# Patient Record
Sex: MALE | Race: OTHER | HISPANIC OR LATINO | URBAN - METROPOLITAN AREA
[De-identification: names, ages, dates, MRNs, and addresses within clinical notes are randomized per-mention and may not be internally consistent; named-entity substitution may affect disease eponyms.]

---

## 2017-09-26 ENCOUNTER — EMERGENCY (EMERGENCY)
Facility: HOSPITAL | Age: 52
LOS: 1 days | Discharge: HOME | End: 2017-09-26
Attending: EMERGENCY MEDICINE
Payer: COMMERCIAL

## 2017-09-26 VITALS
OXYGEN SATURATION: 98 % | RESPIRATION RATE: 18 BRPM | HEIGHT: 65 IN | HEART RATE: 98 BPM | TEMPERATURE: 98 F | SYSTOLIC BLOOD PRESSURE: 169 MMHG | WEIGHT: 175.05 LBS | DIASTOLIC BLOOD PRESSURE: 115 MMHG

## 2017-09-26 PROCEDURE — 99284 EMERGENCY DEPT VISIT MOD MDM: CPT

## 2017-09-26 PROCEDURE — T1013: CPT

## 2017-09-26 PROCEDURE — 99282 EMERGENCY DEPT VISIT SF MDM: CPT

## 2017-09-26 RX ORDER — DIAZEPAM 5 MG
1 TABLET ORAL
Qty: 8 | Refills: 0
Start: 2017-09-26 | End: 2017-09-30

## 2017-09-26 RX ORDER — IBUPROFEN 200 MG
1 TABLET ORAL
Qty: 15 | Refills: 0
Start: 2017-09-26 | End: 2017-10-01

## 2017-09-26 NOTE — ED STATDOCS - OBJECTIVE STATEMENT
52 year old male with son at bedside presenting to the ED complaining of lower back pain. Pt states that his pain onset while performing yard work. He states that he took pain medication this morning. Denies HA, dizziness, numbness, tingling, photophobia, diplopia, change in vision/hearing/gait/mental status/speech, focal weakness, neck pain, rash, fever, chills, stiffness, abdominal pain, hx of DVT/PE, leg swelling, CP, SOB, palpitations, diaphoresis, N/V/D/C, change in urinary/bowel function, dysuria, hematuria, flank pain, malaise, or motor/sensory deficits 52 year old male with son at bedside presenting to the ED complaining of lower back pain. Pt states that his pain onset while performing yard work. He states that he took pain medication this morning. Denies HA, dizziness, numbness, tingling, photophobia, diplopia, change in vision/hearing/gait/mental status/speech, focal weakness, neck pain, rash, fever, chills, stiffness, abdominal pain, hx of DVT/PE, leg swelling, CP, SOB, palpitations, diaphoresis, N/V/D/C, saddle anesthesia. change in urinary/bowel function, dysuria, hematuria, flank pain, malaise, or motor/sensory deficits

## 2017-09-26 NOTE — ED STATDOCS - CHPI ED SYMPTOM NEG
NO BOWEL OR URINARY INCONTINENCE/no fever no fever/NO BOWEL OR URINARY INCONTINENCE, NO SADDLE ANESTHESIA

## 2020-09-24 ENCOUNTER — APPOINTMENT (OUTPATIENT)
Dept: OTOLARYNGOLOGY | Facility: CLINIC | Age: 55
End: 2020-09-24
Payer: COMMERCIAL

## 2020-09-24 VITALS
WEIGHT: 145 LBS | DIASTOLIC BLOOD PRESSURE: 99 MMHG | HEIGHT: 65 IN | TEMPERATURE: 99 F | BODY MASS INDEX: 24.16 KG/M2 | HEART RATE: 77 BPM | SYSTOLIC BLOOD PRESSURE: 152 MMHG

## 2020-09-24 DIAGNOSIS — R22.1 LOCALIZED SWELLING, MASS AND LUMP, NECK: ICD-10-CM

## 2020-09-24 DIAGNOSIS — Z86.79 PERSONAL HISTORY OF OTHER DISEASES OF THE CIRCULATORY SYSTEM: ICD-10-CM

## 2020-09-24 PROCEDURE — 99204 OFFICE O/P NEW MOD 45 MIN: CPT | Mod: 25

## 2020-09-24 PROCEDURE — 31575 DIAGNOSTIC LARYNGOSCOPY: CPT

## 2020-09-24 NOTE — CONSULT LETTER
[Consult Letter:] : I had the pleasure of evaluating your patient, [unfilled]. [Please see my note below.] : Please see my note below. [Consult Closing:] : Thank you very much for allowing me to participate in the care of this patient.  If you have any questions, please do not hesitate to contact me. [Sincerely,] : Sincerely, [FreeTextEntry2] : Tayler Bernard MD [FreeTextEntry1] : Dear Dr.  none,\par \par Thank you for your kind referral. Please refer to my enclosed office notes for MARANDA MONTILLA . If there are any questions free to contact me.\par  [FreeTextEntry3] : ,sig\par

## 2020-09-24 NOTE — PROCEDURE
[de-identified] : Indication for procedure:Unable to examine laryngeal structures with mirror exam\par Scope # \par Topical anesthesia with viscous xylocaine 2% is applied to the anterior nares.\par A flexible fiberoptic laryngoscope is than introduced through the nares.\par The nasopharynx is clear without mass or inflammation.\par The posterior pharyngeal wall is unremarkable.\par The tongue base and vallecula are unremarkable.  The hypopharynx is unremarkable and unobstructed.\par The supraglottic larynx is within normal limits.\par Both vocal cords are fully mobile with no nodule, polyp or other lesion present.\par There is no edema or erythema overlying the arytenoid cartilages or the inter-arytenoid space.\par The subglottic space is clear.\par The voice has a normal quality.\par

## 2020-09-24 NOTE — HISTORY OF PRESENT ILLNESS
[de-identified] : pt w sin for translation\par pt co sore throat  intermittent x 3 mo slowly getting worse\par points to upper midline neck\par associated rt ear pain no tobacco, no etoh

## 2020-09-24 NOTE — ASSESSMENT
[FreeTextEntry1] : large mass rt tonsil, firm to  palpation\par neg palpable neck nodes\par suspicious rt tonsil mass\par ct neck\par fu for biopsy asap

## 2020-09-24 NOTE — REASON FOR VISIT
[Initial Consultation] : an initial consultation for [Ear Pain] : ear pain [FreeTextEntry2] : DYSPHAGIA AND EAR PAIN

## 2020-10-07 ENCOUNTER — OUTPATIENT (OUTPATIENT)
Dept: OUTPATIENT SERVICES | Facility: HOSPITAL | Age: 55
LOS: 1 days | End: 2020-10-07
Payer: COMMERCIAL

## 2020-10-07 ENCOUNTER — RESULT REVIEW (OUTPATIENT)
Age: 55
End: 2020-10-07

## 2020-10-07 ENCOUNTER — APPOINTMENT (OUTPATIENT)
Dept: CT IMAGING | Facility: CLINIC | Age: 55
End: 2020-10-07
Payer: COMMERCIAL

## 2020-10-07 DIAGNOSIS — J35.9 CHRONIC DISEASE OF TONSILS AND ADENOIDS, UNSPECIFIED: ICD-10-CM

## 2020-10-07 DIAGNOSIS — Z00.00 ENCOUNTER FOR GENERAL ADULT MEDICAL EXAMINATION WITHOUT ABNORMAL FINDINGS: ICD-10-CM

## 2020-10-07 PROCEDURE — 70491 CT SOFT TISSUE NECK W/DYE: CPT | Mod: 26

## 2020-10-07 PROCEDURE — 70491 CT SOFT TISSUE NECK W/DYE: CPT

## 2020-10-12 ENCOUNTER — APPOINTMENT (OUTPATIENT)
Dept: OTOLARYNGOLOGY | Facility: CLINIC | Age: 55
End: 2020-10-12
Payer: COMMERCIAL

## 2020-10-12 ENCOUNTER — APPOINTMENT (OUTPATIENT)
Dept: OTOLARYNGOLOGY | Facility: CLINIC | Age: 55
End: 2020-10-12

## 2020-10-12 VITALS
DIASTOLIC BLOOD PRESSURE: 101 MMHG | SYSTOLIC BLOOD PRESSURE: 176 MMHG | HEIGHT: 65 IN | WEIGHT: 145 LBS | TEMPERATURE: 98 F | BODY MASS INDEX: 24.16 KG/M2 | HEART RATE: 75 BPM

## 2020-10-12 VITALS — HEART RATE: 61 BPM | SYSTOLIC BLOOD PRESSURE: 146 MMHG | DIASTOLIC BLOOD PRESSURE: 95 MMHG

## 2020-10-12 PROCEDURE — 99213 OFFICE O/P EST LOW 20 MIN: CPT | Mod: 25

## 2020-10-12 PROCEDURE — 99214 OFFICE O/P EST MOD 30 MIN: CPT | Mod: 25

## 2020-10-12 PROCEDURE — 42800 BIOPSY OF THROAT: CPT | Mod: RT

## 2020-10-12 PROCEDURE — 31575 DIAGNOSTIC LARYNGOSCOPY: CPT

## 2020-10-12 NOTE — REASON FOR VISIT
[Initial Evaluation] : an initial evaluation for [[employer] ] : provided by [employer]   [FreeTextEntry1] : [id number] [FreeTextEntry2] : claudette [TWNoteComboBox1] : F[place]

## 2020-10-12 NOTE — REASON FOR VISIT
[Subsequent Evaluation] : a subsequent evaluation for [[employer] ] : provided by [employer]   [FreeTextEntry1] : [id number] [FreeTextEntry2] : Howard [TWNoteComboBox1] : Filipino

## 2020-10-12 NOTE — PHYSICAL EXAM
[Midline] : trachea located in midline position [Normal] : no rashes [de-identified] : large mass firm rt tonsil extendng to rt tongue base

## 2020-10-12 NOTE — ASSESSMENT
[FreeTextEntry1] : ct reviewed w pt\par lesion rt tonsil suspicious for malignant neoplasm\par fu [facility]\par biopsy taken rt mid tonsil

## 2020-10-12 NOTE — PROCEDURE
[FreeTextEntry2] : rt tonsil mass [FreeTextEntry3] : xylo w epi 2 cc biopsy taken rt tonsil\par ag nitrate cautery\par welltolerated

## 2020-10-12 NOTE — HISTORY OF PRESENT ILLNESS
[de-identified] : pt is refer by Dr. Navarrete for right tonsil mass. biopsy of tonsil done at office today. pt has ct of neck showed  enhancing mass centered in the right palatine tonsil with invasion of the right medial pterygoid muscle and possible invasion of the right base of tongue. pt c.o sore throat for 2-3 months on and off radiate to right neck and right ear, pt has some dysphagia on solid food. pt is eating soft diet about 1 month. wt loss about -10lbs. no blood from the mouth or coughing up blood.  never smoke , social etoh.

## 2020-10-12 NOTE — PHYSICAL EXAM
[Laryngoscopy Performed] : laryngoscopy was performed, see procedure section for findings [FreeTextEntry1] : Significant trismus with interincisor opening of approx. 1.5 cm.  Large R. tonsil mass involving the entire right timi soft palate and essentially completely obstructing the R. half of the OPx with involvement of the BOT.   [Normal] : no rashes

## 2020-10-12 NOTE — PROCEDURE
[Lesion] : lesion identified by mirror examination needing further evaluation [None] : none [Flexible Endoscope] : examined with the flexible endoscope [Serial Number: ___] : Serial Number: [unfilled] [de-identified] : Patient with right tonsil mass involving the soft palate, the right lateral pharyngeal wall, BOT.  This completely obstructs the OPx on the right but the left side is patent.  There doesn't appear to be any involvement of the epiglottis and the larynx.   VC are mobile, airway patent.\par

## 2020-10-19 ENCOUNTER — RESULT REVIEW (OUTPATIENT)
Age: 55
End: 2020-10-19

## 2020-10-21 ENCOUNTER — APPOINTMENT (OUTPATIENT)
Dept: NUCLEAR MEDICINE | Facility: CLINIC | Age: 55
End: 2020-10-21
Payer: COMMERCIAL

## 2020-10-21 ENCOUNTER — OUTPATIENT (OUTPATIENT)
Dept: OUTPATIENT SERVICES | Facility: HOSPITAL | Age: 55
LOS: 1 days | End: 2020-10-21

## 2020-10-21 DIAGNOSIS — C09.9 MALIGNANT NEOPLASM OF TONSIL, UNSPECIFIED: ICD-10-CM

## 2020-10-21 PROCEDURE — 78815 PET IMAGE W/CT SKULL-THIGH: CPT | Mod: 26,PI

## 2020-10-26 ENCOUNTER — APPOINTMENT (OUTPATIENT)
Dept: OTOLARYNGOLOGY | Facility: CLINIC | Age: 55
End: 2020-10-26
Payer: COMMERCIAL

## 2020-10-26 VITALS — SYSTOLIC BLOOD PRESSURE: 152 MMHG | HEART RATE: 76 BPM | DIASTOLIC BLOOD PRESSURE: 100 MMHG

## 2020-10-26 PROCEDURE — 99072 ADDL SUPL MATRL&STAF TM PHE: CPT

## 2020-10-26 PROCEDURE — 31575 DIAGNOSTIC LARYNGOSCOPY: CPT

## 2020-10-26 PROCEDURE — 99215 OFFICE O/P EST HI 40 MIN: CPT | Mod: 25

## 2020-10-26 NOTE — HISTORY OF PRESENT ILLNESS
[de-identified] : pt is refer by Dr. 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 pt c.o sore throat for 2-3 months on and off radiate to right neck and right ear, pt has some dysphagia on solid food. pt is eating soft diet about 1 month. wt loss about -10lbs. no blood from the mouth or coughing up blood.  never smoke , social etoh. pt is here today to pet ct result and further treatment plan. pt will see Dr. Strong tomorrow and Dr. Cain on 10/30/2020. pt states pain worsen at night and not eat well.

## 2020-10-26 NOTE — PHYSICAL EXAM
[Laryngoscopy Performed] : laryngoscopy was performed, see procedure section for findings [Normal] : no rashes [FreeTextEntry1] : Significant trismus with interincisor opening of approx. 1.5 cm.  Large R. tonsil mass involving the entire right timi soft palate and essentially completely obstructing the R. half of the OPx with involvement of the BOT.

## 2020-10-26 NOTE — REASON FOR VISIT
[[employer] ] : provided by [employer]   [Subsequent Evaluation] : a subsequent evaluation for [FreeTextEntry1] : [id number] [FreeTextEntry2] : layne [TWNoteComboBox1] : Argentine

## 2020-10-26 NOTE — PROCEDURE
[Lesion] : lesion identified by mirror examination needing further evaluation [None] : none [Flexible Endoscope] : examined with the flexible endoscope [Serial Number: ___] : Serial Number: [unfilled] [de-identified] : Patient with right tonsil mass involving the soft palate, the right lateral pharyngeal wall, BOT. This completely obstructs the OPx on the right but the left side is patent. There doesn't appear to be any involvement of the epiglottis and the larynx. VC are mobile, airway patent.

## 2020-10-27 ENCOUNTER — OUTPATIENT (OUTPATIENT)
Dept: OUTPATIENT SERVICES | Facility: HOSPITAL | Age: 55
LOS: 1 days | Discharge: HOME | End: 2020-10-27
Payer: COMMERCIAL

## 2020-10-27 ENCOUNTER — APPOINTMENT (OUTPATIENT)
Dept: RADIATION ONCOLOGY | Facility: CLINIC | Age: 55
End: 2020-10-27
Payer: COMMERCIAL

## 2020-10-27 VITALS
SYSTOLIC BLOOD PRESSURE: 147 MMHG | TEMPERATURE: 98 F | BODY MASS INDEX: 26.82 KG/M2 | WEIGHT: 161 LBS | DIASTOLIC BLOOD PRESSURE: 103 MMHG | HEART RATE: 68 BPM | HEIGHT: 65 IN | OXYGEN SATURATION: 98 %

## 2020-10-27 DIAGNOSIS — Z80.42 FAMILY HISTORY OF MALIGNANT NEOPLASM OF PROSTATE: ICD-10-CM

## 2020-10-27 DIAGNOSIS — D10.4 BENIGN NEOPLASM OF TONSIL: ICD-10-CM

## 2020-10-27 PROCEDURE — 99072 ADDL SUPL MATRL&STAF TM PHE: CPT

## 2020-10-27 PROCEDURE — 99205 OFFICE O/P NEW HI 60 MIN: CPT

## 2020-10-27 PROCEDURE — 77263 THER RADIOLOGY TX PLNG CPLX: CPT

## 2020-10-27 NOTE — DATA REVIEWED
[FreeTextEntry1] : 10/7/20 CT Neck Soft Tissue w/ IV Cont \par \par COMPARISON: None available.\par \par FINDINGS:\par There is a large heterogeneously enhancing mass centered in the right palatine tonsil with invasion\par of the right medial pterygoid muscle and mass effect on and concern for invasion of right base of\par tongue. This mass measures approximately 4.2 x 4.5 x 4.6 cm (ap x trv x cc). There is associated\par effacement of the right parapharyngeal fat, right vallecula, and right piriform sinus. The\par oropharyngeal airway is mildly narrowed and deviated to the left.\par \par There are mildly prominent although subcentimeter right level II lymph nodes measuring up to 0.8 cm\par in short axis diameter on 3:141 and level IIa and 0.6 mm in short axis diameter level on\par 3:143 in level IIb.\par \par The thyroid gland is enlarged and heterogeneous enhancing with some left lobe calcifications, likely\par representing goiter.\par \par The submandibular and parotid glands are unremarkable.\par \par There is mild mucosal thickening in the anterior ethmoid air cells and right maxillary sinus. The\par mastoid air cells and bilateral middle ear cavities are clear.\par \par There are degenerative changes in the visualized spine.\par \par The visualized lung apices are unremarkable.\par \par IMPRESSION:\par 4.2 x 4.5 x 4.6 cm heterogeneously enhancing mass centered in the right palatine tonsil with\par invasion of the right medial pterygoid muscle and possible invasion of the right base of tongue, most\par concerning for primary malignancy (squamous cell carcinoma). Recommend tissue sampling.\par \par No pathologic cervical lymphadenopathy by size criteria.\par \par \par PET/CT 10/21/2020\par IMPRESSION:\par \par 1.  FDG avid RIGHT tonsillar mass, consistent with known malignancy.\par \par 2.  FDG avid focus posterior RIGHT nasal cavity and posterior nasopharynx, indeterminate. Suggest correlation with direct inspection.\par \par 3.  FDG avid RIGHT level 2 cervical lymph nodes, suspicious for metastatic disease.\par \par 4.  Mildly FDG avid LEFT level IIa lymph node, indeterminate. Consider further evaluation with ultrasound and tissue sampling as indicated.\par \par 5.  FDG avid focus right prostate, indeterminate. Suggest correlation with PSA and urologic evaluation.\par \par 6.  Goiter with diffusely increased FDG avidity, possibly thyroiditis. Suggest correlation with thyroid function tests.\par \par

## 2020-10-27 NOTE — LETTER GREETING
[Dear Doctor] : Dear Doctor, [Consult Letter:] : Your patient, [unfilled] was seen in my office today for consultation. [Please see my note below.] : Please see my note below. [FreeTextEntry2] : Juan Baez MD

## 2020-10-27 NOTE — DISEASE MANAGEMENT
[Clinical] : TNM Stage: c [III] : III [FreeTextEntry4] : squamous cell carcinoma [TTNM] : 4 [NTNM] : 2 [MTNM] : 0 [de-identified] : 7000cGy [de-identified] : with dose painting

## 2020-10-27 NOTE — REASON FOR VISIT
[Head and Neck Cancer] : head and neck cancer [Consideration of Curative Therapy] : consideration of curative therapy for [Family Member] : family member [FreeTextEntry2] : Jatin [TWNoteComboBox1] : [place]

## 2020-10-27 NOTE — VITALS
[Maximal Pain Intensity: 5/10] : 5/10 [Least Pain Intensity: 1/10] : 1/10 [Pain Description/Quality: ___] : Pain description/quality: [unfilled] [Pain Duration: ___] : Pain duration: [unfilled] [Pain Location: ___] : Pain Location: [unfilled] [Pain Interferes with ADLs] : Pain interferes with activities of daily living. [Opioid] : opioid [80: Normal activity with effort; some signs or symptoms of disease.] : 80: Normal activity with effort; some signs or symptoms of disease.  [ECOG Performance Status: 1 - Restricted in physically strenuous activity but ambulatory and able to carry out work of a light or sedentary nature] : Performance Status: 1 - Restricted in physically strenuous activity but ambulatory and able to carry out work of a light or sedentary nature, e.g., light house work, office work

## 2020-10-27 NOTE — LETTER CLOSING
[Consult Closing:] : Thank you for allowing me to participate in the care of this patient.  If you have any questions, please do not hesitate to contact me. [Sincerely yours,] : Sincerely yours, [FreeTextEntry3] : Lonnie Strong MD\par Physician in Chief\par Department of Radiation Medicine\par [facility] [place]\par [facility] [place]\par \par  of Radiation Medicine\par [facility]\par at  [facility]\par \par Radiation \par [facility]/\par [facility] [place]\par [address]\par [place], [place] [address]\par \par Tel: [phone]\par Fax: [phone]\par

## 2020-10-27 NOTE — HISTORY OF PRESENT ILLNESS
[FreeTextEntry1] : This 55 year-old [place] speaking man presents for radiation medicine consultation.  He was referred to Dr. Baez by Dr. Navarrete for a 3-month history of sore throat increasing in intensity, dysphagia, and pain radiating to the right ear.  A right tonsil mass was noted.  CT of neck showed enhancing mass centered in the right palatine tonsil with invasion of the right medial pterygoid muscle and possible invasion of the right base of tongue. Biopsy was done by Dr. Baez in his office on 10/12/20.  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 PET /CT suggest cervical lymph node metastases.\par

## 2020-10-27 NOTE — REVIEW OF SYSTEMS
[Recent Change In Weight] : ~T recent weight change [Dysphagia] : dysphagia [Odynophagia] : odynophagia [Negative] : Allergic/Immunologic [Hoarseness] : no hoarseness [FreeTextEntry2] : estimates approximately 10 lb wt loss. [FreeTextEntry4] : trismus; some jasmin-ear pain on right side. [FreeTextEntry5] : hypertention

## 2020-10-30 ENCOUNTER — RESULT REVIEW (OUTPATIENT)
Age: 55
End: 2020-10-30

## 2020-10-30 ENCOUNTER — APPOINTMENT (OUTPATIENT)
Dept: HEMATOLOGY ONCOLOGY | Facility: CLINIC | Age: 55
End: 2020-10-30
Payer: COMMERCIAL

## 2020-10-30 ENCOUNTER — APPOINTMENT (OUTPATIENT)
Dept: GASTROENTEROLOGY | Facility: CLINIC | Age: 55
End: 2020-10-30
Payer: COMMERCIAL

## 2020-10-30 VITALS
WEIGHT: 158 LBS | HEIGHT: 66 IN | BODY MASS INDEX: 25.39 KG/M2 | RESPIRATION RATE: 15 BRPM | TEMPERATURE: 97.7 F | HEART RATE: 62 BPM | SYSTOLIC BLOOD PRESSURE: 148 MMHG | DIASTOLIC BLOOD PRESSURE: 102 MMHG | OXYGEN SATURATION: 98 %

## 2020-10-30 VITALS
BODY MASS INDEX: 25.95 KG/M2 | DIASTOLIC BLOOD PRESSURE: 99 MMHG | OXYGEN SATURATION: 97 % | HEART RATE: 69 BPM | SYSTOLIC BLOOD PRESSURE: 144 MMHG | WEIGHT: 161.44 LBS | TEMPERATURE: 97.3 F | HEIGHT: 66 IN

## 2020-10-30 DIAGNOSIS — Z80.0 FAMILY HISTORY OF MALIGNANT NEOPLASM OF DIGESTIVE ORGANS: ICD-10-CM

## 2020-10-30 LAB
BASOPHILS # BLD AUTO: 0.1 K/UL — SIGNIFICANT CHANGE UP (ref 0–0.2)
BASOPHILS NFR BLD AUTO: 0.7 % — SIGNIFICANT CHANGE UP (ref 0–2)
EOSINOPHIL # BLD AUTO: 0.2 K/UL — SIGNIFICANT CHANGE UP (ref 0–0.5)
EOSINOPHIL NFR BLD AUTO: 1.3 % — SIGNIFICANT CHANGE UP (ref 0–6)
HCT VFR BLD CALC: 48.2 % — SIGNIFICANT CHANGE UP (ref 39–50)
HGB BLD-MCNC: 15.6 G/DL — SIGNIFICANT CHANGE UP (ref 13–17)
LYMPHOCYTES # BLD AUTO: 18.5 % — SIGNIFICANT CHANGE UP (ref 13–44)
LYMPHOCYTES # BLD AUTO: 2.2 K/UL — SIGNIFICANT CHANGE UP (ref 1–3.3)
MCHC RBC-ENTMCNC: 30.1 PG — SIGNIFICANT CHANGE UP (ref 27–34)
MCHC RBC-ENTMCNC: 32.2 G/DL — SIGNIFICANT CHANGE UP (ref 32–36)
MCV RBC AUTO: 93.5 FL — SIGNIFICANT CHANGE UP (ref 80–100)
MONOCYTES # BLD AUTO: 0.8 K/UL — SIGNIFICANT CHANGE UP (ref 0–0.9)
MONOCYTES NFR BLD AUTO: 7.1 % — SIGNIFICANT CHANGE UP (ref 2–14)
NEUTROPHILS # BLD AUTO: 8.5 K/UL — HIGH (ref 1.8–7.4)
NEUTROPHILS NFR BLD AUTO: 72.4 % — SIGNIFICANT CHANGE UP (ref 43–77)
PLATELET # BLD AUTO: 265 K/UL — SIGNIFICANT CHANGE UP (ref 150–400)
RBC # BLD: 5.16 M/UL — SIGNIFICANT CHANGE UP (ref 4.2–5.8)
RBC # FLD: 11.9 % — SIGNIFICANT CHANGE UP (ref 10.3–14.5)
WBC # BLD: 11.8 K/UL — HIGH (ref 3.8–10.5)
WBC # FLD AUTO: 11.8 K/UL — HIGH (ref 3.8–10.5)

## 2020-10-30 PROCEDURE — 99205 OFFICE O/P NEW HI 60 MIN: CPT

## 2020-10-30 PROCEDURE — 93010 ELECTROCARDIOGRAM REPORT: CPT

## 2020-10-30 PROCEDURE — 99072 ADDL SUPL MATRL&STAF TM PHE: CPT

## 2020-10-30 PROCEDURE — 99203 OFFICE O/P NEW LOW 30 MIN: CPT

## 2020-10-30 NOTE — PHYSICAL EXAM
[General Appearance - Alert] : alert [General Appearance - In No Acute Distress] : in no acute distress [General Appearance - Well Nourished] : well nourished [General Appearance - Well Developed] : well developed [General Appearance - Well-Appearing] : healthy appearing [Sclera] : the sclera and conjunctiva were normal [PERRL With Normal Accommodation] : pupils were equal in size, round, and reactive to light [Extraocular Movements] : extraocular movements were intact [Outer Ear] : the ears and nose were normal in appearance [Hearing Threshold Finger Rub Not [place]] : hearing was normal [Examination Of The Oral Cavity] : the lips and gums were normal [Neck Appearance] : the appearance of the neck was normal [Auscultation Breath Sounds / Voice Sounds] : lungs were clear to auscultation bilaterally [Heart Rate And Rhythm] : heart rate was normal and rhythm regular [Heart Sounds] : normal S1 and S2 [Heart Sounds Gallop] : no gallops [Murmurs] : no murmurs [Heart Sounds Pericardial Friction Rub] : no pericardial rub [Bowel Sounds] : normal bowel sounds [Abdomen Soft] : soft [Abdomen Tenderness] : non-tender [Abdomen Mass (___ Cm)] : no abdominal mass palpated [No CVA Tenderness] : no ~M costovertebral angle tenderness [No Spinal Tenderness] : no spinal tenderness [Abnormal Walk] : normal gait [Nail Clubbing] : no clubbing  or cyanosis of the fingernails [Musculoskeletal - Swelling] : no joint swelling seen [Motor Tone] : muscle strength and tone were normal [Skin Color & Pigmentation] : normal skin color and pigmentation [Skin Turgor] : normal skin turgor [] : no rash [Motor Exam] : the motor exam was normal [No Focal Deficits] : no focal deficits [Oriented To Time, Place, And Person] : oriented to person, place, and time [Impaired Insight] : insight and judgment were intact [Affect] : the affect was normal

## 2020-10-30 NOTE — HISTORY OF PRESENT ILLNESS
[T: ___] : T[unfilled] [N: ___] : N[unfilled] [M: ___] : M[unfilled] [AJCC Stage: ____] : AJCC Stage: [unfilled] [de-identified] : The patient was diagnosed with SCCA  of the right tonsil in October 2020 at the age of 55.  He presented to ENT, Dr. Freddie Navarrete, c/o worsening sore throat, dysphagia and right ear pain.  Physical exam showed a firm mass encompassing the right tonsil.  CT neck showed a large heterogeneously enhancing mass centered in the right palatine tonsil with invasion of the right medial pterygoid muscle and mass effect on and concern for invasion of right base of tongue. This mass measures approximately 4.2 x 4.5 x 4.6 cm. There is associated effacement of the right parapharyngeal fat, right vallecula, and right piriform sinus. The oropharyngeal airway is mildly narrowed and deviated to the left.  There are mildly prominent although subcentimeter right level II lymph nodes measuring up to 0.8 cm in short axis diameter and level IIa and 0.6 mm in short axis diameter level in level IIb. Biopsy with Dr. Navarrete c/w squamous cell carcinoma, non-keratinizing, consistent with HPV-associated squamous cell carcinoma. The outside immunostains show p16, pancytokerain AE1/AE3, Cam5.2, and p63 are positive. \par \par He next saw Dr. Juan Baez where a laryngoscopy showed a right tonsil mass involving the soft palate, the right lateral pharyngeal wall, BOT. This completely obstructs the OPx on the right but the left side is patent. There doesn't appear to be any involvement of the epiglottis and the larynx. VC are mobile, airway patent.  Staging PET showed an FDG avid mass centered in the right palatine tonsil with invasion of the right medial pterygoid muscle and probable direct invasion of the right base of tongue (SUV 16.1). Mass approaches but does not extend across midline. Effacement of the right parapharyngeal fat, right vallecula, and right piriform sinus. Deviation of the oropharyngeal airway towards the left with mild associated narrowing, unchanged since October 7, 2020.  FDG avid focus right posterior nasal cavity (SUV 6.5) and in the region of the torus tubarius (SUV 4.9).  FDG avid right level 2 cervical lymph nodes:  Right level IIa, 1.0 cm, (SUV 3.4).  Right level IIb, 0.7 cm (SUV 3.2).  FDG avid left level IIa lymph node 1.0 cm (SUV 3.2). Diffuse enlargement of the thyroid gland with uniform FDG avidity (SUV 6.8).  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  [de-identified] : Pt presents today with no specific complaints today aside from odynophagia x ~ 3-4 months.  He reports intermittent dysphagia with solids only.   He has no hoarseness or trismus; some jasmin-auricular pain on right side. Pt is eating soft diet about 1 month. wt loss about -10lbs. no blood from the mouth or coughing up blood. See detailed ROS below.

## 2020-10-30 NOTE — RESULTS/DATA
[FreeTextEntry1] : 10/21/20 PET:\par FDG avid mass centered in the right palatine tonsil with invasion of the right medial pterygoid muscle and probable direct invasion of the right base of tongue (SUV 16.1). Mass approaches but does not extend across midline. Effacement of the right parapharyngeal fat, right vallecula, and right piriform sinus. Deviation of the oropharyngeal airway towards the left with mild associated narrowing, unchanged since October 7, 2020.  FDG avid focus right posterior nasal cavity (SUV 6.5) and in the region of the torus tubarius (SUV 4.9).  FDG avid right level 2 cervical lymph nodes:  Right level IIa, 1.0 cm, (SUV 3.4).  Right level IIb, 0.7 cm (SUV 3.2).  FDG avid left level IIa lymph node 1.0 cm (SUV 3.2). Diffuse enlargement of the thyroid gland with uniform FDG avidity (SUV 6.8).\par \par 10/12/20 Pathology:\par Tonsil, right, biopsy:  Squamous cell carcinoma, non-keratinizing, consistent with HPV-associated squamous cell carcinoma. The outside immunostains show p16, pancytokerain AE1/AE3, Cam5.2, and p63 are positive.  \par \par 10/7/20 CT Neck:\par large heterogeneously enhancing mass centered in the right palatine tonsil with invasion of the right medial pterygoid muscle and mass effect on and concern for invasion of right base of tongue. This mass measures approximately 4.2 x 4.5 x 4.6 cm (ap x trv x cc). There is associated effacement of the right parapharyngeal fat, right vallecula, and right piriform sinus. The oropharyngeal airway is mildly narrowed and deviated to the left.  There are mildly prominent although subcentimeter right level II lymph nodes measuring up to 0.8 cm in short axis diameter on 3:141 and level IIa and 0.6 mm in short axis diameter level on 3:143 in level IIb.

## 2020-10-30 NOTE — ASSESSMENT
[FreeTextEntry1] : The patient is agreeable to undergoing a percutaneous endoscopic gastrostomy tube placement which will be scheduled at [facility]. The indications as well as the procedure itself and the risks associated with the procedure were discussed with the patient.

## 2020-10-30 NOTE — PHYSICAL EXAM
[Restricted in physically strenuous activity but ambulatory and able to carry out work of a light or sedentary nature] : Status 1- Restricted in physically strenuous activity but ambulatory and able to carry out work of a light or sedentary nature, e.g., light house work, office work [Normal] : affect appropriate [de-identified] :  Significant trismus with interincisor opening of approx. 1.5 cm. Large R. tonsil mass involving the entire right timi soft palate and essentially completely obstructing the R. half of the OPx with involvement of the BOT. \par Significant trismus with interincisor opening of approx. 1.5 cm. Large R. tonsil mass involving the entire right timi soft palate and essentially completely obstructing the R. half of the OPx with involvement of the BOT

## 2020-10-30 NOTE — REASON FOR VISIT
[Consultation] : a consultation visit [FreeTextEntry1] : squamous cell tonsillar cancer and dysphagia

## 2020-10-30 NOTE — HISTORY OF PRESENT ILLNESS
[de-identified] : 3 months ago the patient noted the presence of enlarging mass on the right side of his neck and difficulty swallowing solids which has not significantly worsened over this period of time. He was eventually evaluated and was found to have a squamous cell carcinoma of his right tonsil as locally invasive. He is about to undergo both radiation and chemotherapy. He has had a 10 pound weight loss which more recently has stabilized. He can manage liquids without difficulty and most solids but has to eat slowly. It is requested that he undergo a gastrostomy tube placement in anticipation of an exacerbation of the dysphagia as a result of the chemotherapy and radiation therapy. He denies any abdominal pain, nausea or vomiting. There is no easy bruising or excessive bleeding. His appetite is good. There is no diarrhea, constipation, rectal bleeding or melena.

## 2020-10-30 NOTE — CONSULT LETTER
[Dear  ___] : Dear  [unfilled], [Consult Letter:] : I had the pleasure of evaluating your patient, [unfilled]. [Please see my note below.] : Please see my note below. [Consult Closing:] : Thank you very much for allowing me to participate in the care of this patient.  If you have any questions, please do not hesitate to contact me. [Sincerely,] : Sincerely, [DrHunter  ___] : Dr. ROGERS [FreeTextEntry3] : Dr. Kaia Cain

## 2020-11-02 ENCOUNTER — NON-APPOINTMENT (OUTPATIENT)
Age: 55
End: 2020-11-02

## 2020-11-02 LAB
ALBUMIN SERPL ELPH-MCNC: 4.5 G/DL
ALP BLD-CCNC: 63 U/L
ALT SERPL-CCNC: 14 U/L
ANION GAP SERPL CALC-SCNC: 12 MMOL/L
AST SERPL-CCNC: 22 U/L
BILIRUB SERPL-MCNC: 0.8 MG/DL
BUN SERPL-MCNC: 8 MG/DL
CALCIUM SERPL-MCNC: 9.8 MG/DL
CHLORIDE SERPL-SCNC: 95 MMOL/L
CO2 SERPL-SCNC: 30 MMOL/L
CREAT SERPL-MCNC: 0.86 MG/DL
GLUCOSE SERPL-MCNC: 134 MG/DL
HBV CORE IGG+IGM SER QL: REACTIVE
HBV SURFACE AB SER QL: REACTIVE
HBV SURFACE AG SER QL: NONREACTIVE
HCV AB SER QL: NONREACTIVE
HCV S/CO RATIO: 0.21 S/CO
INR PPP: 1.11 RATIO
MAGNESIUM SERPL-MCNC: 2 MG/DL
POTASSIUM SERPL-SCNC: 4.3 MMOL/L
PROT SERPL-MCNC: 7.9 G/DL
PT BLD: 13 SEC
SODIUM SERPL-SCNC: 137 MMOL/L

## 2020-11-02 PROCEDURE — 77470 SPECIAL RADIATION TREATMENT: CPT | Mod: 26

## 2020-11-03 ENCOUNTER — APPOINTMENT (OUTPATIENT)
Dept: OTOLARYNGOLOGY | Facility: CLINIC | Age: 55
End: 2020-11-03
Payer: COMMERCIAL

## 2020-11-03 ENCOUNTER — APPOINTMENT (OUTPATIENT)
Dept: INTERVENTIONAL RADIOLOGY/VASCULAR | Facility: CLINIC | Age: 55
End: 2020-11-03

## 2020-11-03 VITALS — BODY MASS INDEX: 25.39 KG/M2 | WEIGHT: 158 LBS | HEIGHT: 66 IN | TEMPERATURE: 97.6 F

## 2020-11-03 DIAGNOSIS — R07.0 PAIN IN THROAT: ICD-10-CM

## 2020-11-03 DIAGNOSIS — J35.8 OTHER CHRONIC DISEASES OF TONSILS AND ADENOIDS: ICD-10-CM

## 2020-11-03 PROCEDURE — 99214 OFFICE O/P EST MOD 30 MIN: CPT

## 2020-11-03 PROCEDURE — 99072 ADDL SUPL MATRL&STAF TM PHE: CPT

## 2020-11-03 NOTE — HISTORY OF PRESENT ILLNESS
[de-identified] : Followed by Dr. Baez - hx cancer in right tonsil.  Saw Dr. Baez October 26,2020 - about one week ago.  Here today due to right ear pain.  Here to r/o infection.  Patient sent by oncologist to r/o ear infection.

## 2020-11-03 NOTE — ASSESSMENT
[FreeTextEntry1] : Patient here with right otalgia.  No evidence of ear infection.  Patient with large right tonsil cancer. Feel pain and other issues are from tonsil cancer.  Pain is referred from throat to ear.  Recommended patient return to oncology and have treatment as recommended by oncology and Dr. Baez.

## 2020-11-03 NOTE — PHYSICAL EXAM
[de-identified] : 2 + left tonsil - LARGE RIGHT TONSIL MASS EXTENDING TO BOT  - PATIENT NOTED TO HAVE TRISMUS ON ATTEMPTING TO OPEN MOUTH.   [Normal] : no rashes

## 2020-11-03 NOTE — REASON FOR VISIT
[Subsequent Evaluation] : a subsequent evaluation for [[employer] ] : provided by [employer]   [FreeTextEntry1] : [id number] [FreeTextEntry2] : Nayeli [TWNoteComboBox1] : Hungarian

## 2020-11-09 ENCOUNTER — APPOINTMENT (OUTPATIENT)
Dept: UROLOGY | Facility: CLINIC | Age: 55
End: 2020-11-09
Payer: COMMERCIAL

## 2020-11-09 VITALS
RESPIRATION RATE: 16 BRPM | SYSTOLIC BLOOD PRESSURE: 149 MMHG | WEIGHT: 157 LBS | DIASTOLIC BLOOD PRESSURE: 103 MMHG | TEMPERATURE: 98.1 F | HEIGHT: 66 IN | BODY MASS INDEX: 25.23 KG/M2 | HEART RATE: 73 BPM

## 2020-11-09 DIAGNOSIS — Z80.3 FAMILY HISTORY OF MALIGNANT NEOPLASM OF BREAST: ICD-10-CM

## 2020-11-09 LAB
BILIRUB UR QL STRIP: NORMAL
CLARITY UR: CLEAR
COLLECTION METHOD: NORMAL
GLUCOSE UR-MCNC: NORMAL
HCG UR QL: 2 EU/DL
HGB UR QL STRIP.AUTO: NORMAL
KETONES UR-MCNC: NORMAL
LEUKOCYTE ESTERASE UR QL STRIP: NORMAL
NITRITE UR QL STRIP: NORMAL
PH UR STRIP: 7
PROT UR STRIP-MCNC: NORMAL
SP GR UR STRIP: 1.02

## 2020-11-09 PROCEDURE — 99202 OFFICE O/P NEW SF 15 MIN: CPT | Mod: 25

## 2020-11-09 PROCEDURE — 99072 ADDL SUPL MATRL&STAF TM PHE: CPT

## 2020-11-09 PROCEDURE — 81003 URINALYSIS AUTO W/O SCOPE: CPT | Mod: QW

## 2020-11-09 RX ORDER — ATENOLOL AND CHLORTHALIDONE 100; 25 MG/1; MG/1
100-25 TABLET ORAL
Qty: 30 | Refills: 0 | Status: DISCONTINUED | COMMUNITY
Start: 2020-10-05 | End: 2020-11-09

## 2020-11-09 RX ORDER — PROCHLORPERAZINE MALEATE 10 MG/1
10 TABLET ORAL EVERY 6 HOURS
Qty: 120 | Refills: 2 | Status: DISCONTINUED | COMMUNITY
Start: 2020-10-30 | End: 2020-11-09

## 2020-11-09 RX ORDER — OXYCODONE AND ACETAMINOPHEN 5; 325 MG/1; MG/1
5-325 TABLET ORAL
Qty: 20 | Refills: 0 | Status: DISCONTINUED | COMMUNITY
Start: 2020-10-06 | End: 2020-11-09

## 2020-11-09 NOTE — LETTER BODY
[Dear  ___] : Dear  [unfilled], [Courtesy Letter:] : I had the pleasure of seeing your patient, [unfilled], in my office today. [Please see my note below.] : Please see my note below. [Sincerely,] : Sincerely, [FreeTextEntry3] : Ed\par \par Herrera Emerson MD\par [facility]\par  of Urology\par [employer] [facility]\par  [DrHunter  ___] : Dr. ROGERS

## 2020-11-09 NOTE — ASSESSMENT
[FreeTextEntry1] : possible prostate lesion.  \par \par will get PSA from lab.  If normal then followup PRN

## 2020-11-09 NOTE — PHYSICAL EXAM
[General Appearance - Well Developed] : well developed [General Appearance - Well Nourished] : well nourished [Normal Appearance] : normal appearance [Well Groomed] : well groomed [General Appearance - In No Acute Distress] : no acute distress [Edema] : no peripheral edema [] : no respiratory distress [Respiration, Rhythm And Depth] : normal respiratory rhythm and effort [Exaggerated Use Of Accessory Muscles For Inspiration] : no accessory muscle use [Abdomen Soft] : soft [Abdomen Tenderness] : non-tender [Costovertebral Angle Tenderness] : no ~M costovertebral angle tenderness [Urethral Meatus] : meatus normal [Penis Abnormality] : normal circumcised penis [Urinary Bladder Findings] : the bladder was normal on palpation [Scrotum] : the scrotum was normal [Rectal Exam - Seminal Vesicles] : the seminal vesicles were normal [Epididymis] : the epididymides were normal [Rectal Exam - Rectum] : digital rectal exam was normal [Prostate Enlargement] : the prostate was not enlarged [No Prostate Nodules] : no prostate nodules [Prostate Size ___ (0-4)] : prostate size [unfilled] (scale: 0-4) [Normal Station and Gait] : the gait and station were normal for the patient's age [Skin Color & Pigmentation] : normal skin color and pigmentation [No Focal Deficits] : no focal deficits

## 2020-11-10 ENCOUNTER — NON-APPOINTMENT (OUTPATIENT)
Age: 55
End: 2020-11-10

## 2020-11-12 ENCOUNTER — NON-APPOINTMENT (OUTPATIENT)
Age: 55
End: 2020-11-12

## 2020-11-12 ENCOUNTER — APPOINTMENT (OUTPATIENT)
Dept: OTOLARYNGOLOGY | Facility: CLINIC | Age: 55
End: 2020-11-12
Payer: COMMERCIAL

## 2020-11-12 PROCEDURE — 92567 TYMPANOMETRY: CPT

## 2020-11-12 PROCEDURE — 92557 COMPREHENSIVE HEARING TEST: CPT

## 2020-11-12 PROCEDURE — 99072 ADDL SUPL MATRL&STAF TM PHE: CPT

## 2020-11-13 ENCOUNTER — OUTPATIENT (OUTPATIENT)
Dept: OUTPATIENT SERVICES | Facility: HOSPITAL | Age: 55
LOS: 1 days | End: 2020-11-13
Payer: COMMERCIAL

## 2020-11-13 VITALS
HEIGHT: 65 IN | RESPIRATION RATE: 18 BRPM | SYSTOLIC BLOOD PRESSURE: 162 MMHG | HEART RATE: 100 BPM | WEIGHT: 156.53 LBS | TEMPERATURE: 98 F | DIASTOLIC BLOOD PRESSURE: 100 MMHG

## 2020-11-13 DIAGNOSIS — Z29.9 ENCOUNTER FOR PROPHYLACTIC MEASURES, UNSPECIFIED: ICD-10-CM

## 2020-11-13 DIAGNOSIS — R13.10 DYSPHAGIA, UNSPECIFIED: ICD-10-CM

## 2020-11-13 DIAGNOSIS — Z98.890 OTHER SPECIFIED POSTPROCEDURAL STATES: Chronic | ICD-10-CM

## 2020-11-13 DIAGNOSIS — I10 ESSENTIAL (PRIMARY) HYPERTENSION: ICD-10-CM

## 2020-11-13 DIAGNOSIS — Z01.818 ENCOUNTER FOR OTHER PREPROCEDURAL EXAMINATION: ICD-10-CM

## 2020-11-13 LAB
A1C WITH ESTIMATED AVERAGE GLUCOSE RESULT: 5.1 % — SIGNIFICANT CHANGE UP (ref 4–5.6)
ANION GAP SERPL CALC-SCNC: 11 MMOL/L — SIGNIFICANT CHANGE UP (ref 5–17)
APTT BLD: 31.8 SEC — SIGNIFICANT CHANGE UP (ref 27.5–35.5)
BASOPHILS # BLD AUTO: 0.07 K/UL — SIGNIFICANT CHANGE UP (ref 0–0.2)
BASOPHILS NFR BLD AUTO: 0.8 % — SIGNIFICANT CHANGE UP (ref 0–2)
BUN SERPL-MCNC: 7 MG/DL — LOW (ref 8–20)
CALCIUM SERPL-MCNC: 9.3 MG/DL — SIGNIFICANT CHANGE UP (ref 8.6–10.2)
CHLORIDE SERPL-SCNC: 96 MMOL/L — LOW (ref 98–107)
CO2 SERPL-SCNC: 30 MMOL/L — HIGH (ref 22–29)
CREAT SERPL-MCNC: 0.78 MG/DL — SIGNIFICANT CHANGE UP (ref 0.5–1.3)
EOSINOPHIL # BLD AUTO: 0.09 K/UL — SIGNIFICANT CHANGE UP (ref 0–0.5)
EOSINOPHIL NFR BLD AUTO: 1 % — SIGNIFICANT CHANGE UP (ref 0–6)
ESTIMATED AVERAGE GLUCOSE: 100 MG/DL — SIGNIFICANT CHANGE UP (ref 68–114)
GLUCOSE SERPL-MCNC: 105 MG/DL — HIGH (ref 70–99)
HCT VFR BLD CALC: 42.9 % — SIGNIFICANT CHANGE UP (ref 39–50)
HGB BLD-MCNC: 14 G/DL — SIGNIFICANT CHANGE UP (ref 13–17)
IMM GRANULOCYTES NFR BLD AUTO: 0.3 % — SIGNIFICANT CHANGE UP (ref 0–1.5)
INR BLD: 1.22 RATIO — HIGH (ref 0.88–1.16)
LYMPHOCYTES # BLD AUTO: 1.74 K/UL — SIGNIFICANT CHANGE UP (ref 1–3.3)
LYMPHOCYTES # BLD AUTO: 18.9 % — SIGNIFICANT CHANGE UP (ref 13–44)
MCHC RBC-ENTMCNC: 29.3 PG — SIGNIFICANT CHANGE UP (ref 27–34)
MCHC RBC-ENTMCNC: 32.6 GM/DL — SIGNIFICANT CHANGE UP (ref 32–36)
MCV RBC AUTO: 89.7 FL — SIGNIFICANT CHANGE UP (ref 80–100)
MONOCYTES # BLD AUTO: 0.74 K/UL — SIGNIFICANT CHANGE UP (ref 0–0.9)
MONOCYTES NFR BLD AUTO: 8 % — SIGNIFICANT CHANGE UP (ref 2–14)
NEUTROPHILS # BLD AUTO: 6.54 K/UL — SIGNIFICANT CHANGE UP (ref 1.8–7.4)
NEUTROPHILS NFR BLD AUTO: 71 % — SIGNIFICANT CHANGE UP (ref 43–77)
PLATELET # BLD AUTO: 252 K/UL — SIGNIFICANT CHANGE UP (ref 150–400)
POTASSIUM SERPL-MCNC: 3.9 MMOL/L — SIGNIFICANT CHANGE UP (ref 3.5–5.3)
POTASSIUM SERPL-SCNC: 3.9 MMOL/L — SIGNIFICANT CHANGE UP (ref 3.5–5.3)
PROTHROM AB SERPL-ACNC: 14 SEC — HIGH (ref 10.6–13.6)
RBC # BLD: 4.78 M/UL — SIGNIFICANT CHANGE UP (ref 4.2–5.8)
RBC # FLD: 12 % — SIGNIFICANT CHANGE UP (ref 10.3–14.5)
SODIUM SERPL-SCNC: 137 MMOL/L — SIGNIFICANT CHANGE UP (ref 135–145)
WBC # BLD: 9.21 K/UL — SIGNIFICANT CHANGE UP (ref 3.8–10.5)
WBC # FLD AUTO: 9.21 K/UL — SIGNIFICANT CHANGE UP (ref 3.8–10.5)

## 2020-11-13 PROCEDURE — 85730 THROMBOPLASTIN TIME PARTIAL: CPT

## 2020-11-13 PROCEDURE — 83036 HEMOGLOBIN GLYCOSYLATED A1C: CPT

## 2020-11-13 PROCEDURE — 36415 COLL VENOUS BLD VENIPUNCTURE: CPT

## 2020-11-13 PROCEDURE — 93010 ELECTROCARDIOGRAM REPORT: CPT

## 2020-11-13 PROCEDURE — 93005 ELECTROCARDIOGRAM TRACING: CPT

## 2020-11-13 PROCEDURE — 85610 PROTHROMBIN TIME: CPT

## 2020-11-13 PROCEDURE — 80048 BASIC METABOLIC PNL TOTAL CA: CPT

## 2020-11-13 PROCEDURE — G0463: CPT

## 2020-11-13 PROCEDURE — 85025 COMPLETE CBC W/AUTO DIFF WBC: CPT

## 2020-11-13 NOTE — H&P PST ADULT - NSANTHOSAYNRD_GEN_A_CORE
No. JAQUELINE screening performed.  STOP BANG Legend: 0-2 = LOW Risk; 3-4 = INTERMEDIATE Risk; 5-8 = HIGH Risk

## 2020-11-13 NOTE — H&P PST ADULT - NSICDXPROBLEM_GEN_ALL_CORE_FT
PROBLEM DIAGNOSES  Problem: HTN (hypertension)  Assessment and Plan: routine labs and ekg  continue medication as directed   medical clearance     Problem: Dysphagia  Assessment and Plan: PEG    Problem: Need for prophylactic measure  Assessment and Plan: moderate risk, the surgical team will order appropriate VTE prophylaxis

## 2020-11-13 NOTE — H&P PST ADULT - ASSESSMENT
CAPRINI SCORE [CLOT]    AGE RELATED RISK FACTORS                                                       MOBILITY RELATED FACTORS  [x ] Age 41-60 years                                            (1 Point)                  [ ] Bed rest                                                        (1 Point)  [ ] Age: 61-74 years                                           (2 Points)                 [ ] Plaster cast                                                   (2 Points)  [ ] Age= 75 years                                              (3 Points)                 [ ] Bed bound for more than 72 hours                 (2 Points)    DISEASE RELATED RISK FACTORS                                               GENDER SPECIFIC FACTORS  [ ] Edema in the lower extremities                       (1 Point)                  [ ] Pregnancy                                                     (1 Point)  [ ] Varicose veins                                               (1 Point)                  [ ] Post-partum < 6 weeks                                   (1 Point)             [ x] BMI > 25 Kg/m2                                            (1 Point)                  [ ] Hormonal therapy  or oral contraception          (1 Point)                 [ ] Sepsis (in the previous month)                        (1 Point)                  [ ] History of pregnancy complications                 (1 point)  [ ] Pneumonia or serious lung disease                                               [ ] Unexplained or recurrent                     (1 Point)           (in the previous month)                               (1 Point)  [ ] Abnormal pulmonary function test                     (1 Point)                 SURGERY RELATED RISK FACTORS  [ ] Acute myocardial infarction                              (1 Point)                 [ ]  Section                                             (1 Point)  [ ] Congestive heart failure (in the previous month)  (1 Point)               [ ] Minor surgery                                                  (1 Point)   [ ] Inflammatory bowel disease                             (1 Point)                 [ ] Arthroscopic surgery                                        (2 Points)  [ ] Central venous access                                      (2 Points)                [x ] General surgery lasting more than 45 minutes   (2 Points)       [ ] Stroke (in the previous month)                          (5 Points)               [ ] Elective arthroplasty                                         (5 Points)                                                                                                                                               HEMATOLOGY RELATED FACTORS                                                 TRAUMA RELATED RISK FACTORS  [ ] Prior episodes of VTE                                     (3 Points)                [ ] Fracture of the hip, pelvis, or leg                       (5 Points)  [ ] Positive family history for VTE                         (3 Points)                 [ ] Acute spinal cord injury (in the previous month)  (5 Points)  [ ] Prothrombin 03126 A                                     (3 Points)                 [ ] Paralysis  (less than 1 month)                             (5 Points)  [ ] Factor V Leiden                                             (3 Points)                  [ ] Multiple Trauma within 1 month                        (5 Points)  [ ] Lupus anticoagulants                                     (3 Points)                                                           [ ] Anticardiolipin antibodies                               (3 Points)                                                       [ ] High homocysteine in the blood                      (3 Points)                                             [ ] Other congenital or acquired thrombophilia      (3 Points)                                                [ ] Heparin induced thrombocytopenia                  (3 Points)                                          Total Score [ 5  ]    Caprini Score 0 - 2:  Low Risk, No VTE Prophylaxis required for most patients, encourage ambulation  Caprini Score 3 - 6:  At Risk, pharmacologic VTE prophylaxis is indicated for most patients (in the absence of a contraindication)  Caprini Score Greater than or = 7:  High Risk, pharmacologic VTE prophylaxis is indicated for most patients (in the absence of a contraindication)      55 year old Latvian speaking male present to pst with h/o HTN and newly diagnosed SCCA of the right tonsil.  Patient continues to c/o worsening sore throat, dysphagia and right ear/ jaw pain. He He is now schedule for Peg placement with Dr. Hernandez 20.  With use of  service patient was educated on verbal and written pre op instructions

## 2020-11-13 NOTE — H&P PST ADULT - HISTORY OF PRESENT ILLNESS
55 year old Danish speaking male present to [facility] with h/o HTN and newly diagnosed SCCA of the right tonsil.  Patient went to his ENT Dr. blackwell with c/o worsening sore throat, dysphagia and right ear pain. He was found to have a mass encompassing the right tonsil.  He had further work up that included CT neck  and biopsy of the mass.  per chart review CT neck showed a large heterogeneously enhancing mass centered in the right palatine tonsil with invasion of the right medial pterygoid muscle and mass effect on and concern for invasion of right base of tongue. This mass measures approximately 4.2 x 4.5 x 4.6 cm. There is associated effacement of the right parapharyngeal fat, right vallecula, and right piriform sinus. The oropharyngeal airway is mildly narrowed and deviated to the left. There are mildly prominent although subcentimeter right level II lymph nodes measuring up to 0.8 cm in short axis diameter and level IIa and 0.6 mm in short axis diameter level in level IIb. Biopsy with Dr. Blackwell c/w squamous cell carcinoma, non-keratinizing, consistent with HPV-associated squamous cell carcinoma.   Patient state he will need radiation to his neck and in preparation for dysphagia related to radiation; his doctor recommends a peg. He is now schedule for Peg placement with Dr. Hernandez 11/19/20  Patient continues to c/o worsening sore throat, dysphagia and right ear/ jaw pain.

## 2020-11-14 DIAGNOSIS — Z01.818 ENCOUNTER FOR OTHER PREPROCEDURAL EXAMINATION: ICD-10-CM

## 2020-11-16 ENCOUNTER — APPOINTMENT (OUTPATIENT)
Dept: DISASTER EMERGENCY | Facility: CLINIC | Age: 55
End: 2020-11-16

## 2020-11-16 PROCEDURE — 77300 RADIATION THERAPY DOSE PLAN: CPT | Mod: 26

## 2020-11-16 PROCEDURE — 77301 RADIOTHERAPY DOSE PLAN IMRT: CPT | Mod: 26

## 2020-11-16 PROCEDURE — 77338 DESIGN MLC DEVICE FOR IMRT: CPT | Mod: 26

## 2020-11-17 LAB — SARS-COV-2 N GENE NPH QL NAA+PROBE: NOT DETECTED

## 2020-11-18 ENCOUNTER — NON-APPOINTMENT (OUTPATIENT)
Age: 55
End: 2020-11-18

## 2020-11-18 PROBLEM — I10 ESSENTIAL (PRIMARY) HYPERTENSION: Chronic | Status: ACTIVE | Noted: 2020-11-13

## 2020-11-19 ENCOUNTER — APPOINTMENT (OUTPATIENT)
Dept: GASTROENTEROLOGY | Facility: HOSPITAL | Age: 55
End: 2020-11-19

## 2020-11-19 ENCOUNTER — OUTPATIENT (OUTPATIENT)
Dept: OUTPATIENT SERVICES | Facility: HOSPITAL | Age: 55
LOS: 1 days | End: 2020-11-19
Payer: COMMERCIAL

## 2020-11-19 DIAGNOSIS — Z98.890 OTHER SPECIFIED POSTPROCEDURAL STATES: Chronic | ICD-10-CM

## 2020-11-19 DIAGNOSIS — R13.10 DYSPHAGIA, UNSPECIFIED: ICD-10-CM

## 2020-11-19 PROCEDURE — L8699: CPT

## 2020-11-19 PROCEDURE — 43246 EGD PLACE GASTROSTOMY TUBE: CPT

## 2020-11-19 NOTE — PROCEDURE
[Procedure Explained] : The procedure was explained [Allergies Reviewed] : allergies reviewed. [Risks] : Risks [Benefits] : benefits [Alternatives] : alternatives [Consent Obtained] : written consent was obtained prior to the procedure and is detailed in the patient's record [Patient] : the patient [Automated Blood Pressure Cuff] : automated blood pressure cuff [Cardiac Monitor] : cardiac monitor [Pulse Oximeter] : pulse oximeter [Propofol ___ mg IV] : Propofol [unfilled] ~Umg intravenously [3] : 3 [Hiatal Hernia] : hiatal hernia [Erythema] : erythema [Normal] : Normal [Tolerated Well] : the patient tolerated the procedure well [Vital Signs Stable] : the vital signs were stable [de-identified] : with gastrostomy tube placement [de-identified] : tonsilar cancer with dysphagia [de-identified] : small hiatal hernia present with normal esophagus [de-identified] : 20 Ukrainian g tube placed successfully using the endoscopic percutaneous technigue without difficulty. [de-identified] : some minor prepyoric erythema was present [de-identified] : Successful and uneventful placement of a 20 Monegasque gastrostomy tube using the percutaneous endoscopic techique.

## 2020-11-20 ENCOUNTER — APPOINTMENT (OUTPATIENT)
Dept: RADIATION ONCOLOGY | Facility: CLINIC | Age: 55
End: 2020-11-20

## 2020-11-23 ENCOUNTER — APPOINTMENT (OUTPATIENT)
Dept: OTOLARYNGOLOGY | Facility: CLINIC | Age: 55
End: 2020-11-23
Payer: COMMERCIAL

## 2020-11-23 VITALS — SYSTOLIC BLOOD PRESSURE: 141 MMHG | HEART RATE: 97 BPM | DIASTOLIC BLOOD PRESSURE: 94 MMHG

## 2020-11-23 PROCEDURE — 99214 OFFICE O/P EST MOD 30 MIN: CPT | Mod: 25

## 2020-11-23 PROCEDURE — 31575 DIAGNOSTIC LARYNGOSCOPY: CPT

## 2020-11-23 NOTE — PROCEDURE
[None] : none [Flexible Endoscope] : examined with the flexible endoscope [Trismus] : trismus preventing mirror examination [Serial Number: ___] : Serial Number: [unfilled] [de-identified] : Patient with right tonsil mass involving the soft palate, the right lateral pharyngeal wall, BOT. This completely obstructs the OPx on the right but the left side is patent. There doesn't appear to be any involvement of the epiglottis and the larynx. VC are mobile, airway patent.  [de-identified] : Tonsil SCCa.

## 2020-11-23 NOTE — REASON FOR VISIT
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

## 2020-11-23 NOTE — HISTORY OF PRESENT ILLNESS
[de-identified] : pt is refer by Dr. 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    never smoke , social etoh.  pt saw Dr. Strong and Dr. Cain. pt was put PEG 11/19/2020 due to dysphagia and now able to eat by  mouth. pt mostly starts radiation tomorrow and then chemo possible.

## 2020-11-24 ENCOUNTER — APPOINTMENT (OUTPATIENT)
Age: 55
End: 2020-11-24

## 2020-11-24 ENCOUNTER — LABORATORY RESULT (OUTPATIENT)
Age: 55
End: 2020-11-24

## 2020-11-24 ENCOUNTER — RESULT REVIEW (OUTPATIENT)
Age: 55
End: 2020-11-24

## 2020-11-24 ENCOUNTER — APPOINTMENT (OUTPATIENT)
Dept: HEMATOLOGY ONCOLOGY | Facility: CLINIC | Age: 55
End: 2020-11-24

## 2020-11-24 LAB
BASOPHILS # BLD AUTO: 0.1 K/UL — SIGNIFICANT CHANGE UP (ref 0–0.2)
BASOPHILS NFR BLD AUTO: 1.1 % — SIGNIFICANT CHANGE UP (ref 0–2)
EOSINOPHIL # BLD AUTO: 0.1 K/UL — SIGNIFICANT CHANGE UP (ref 0–0.5)
EOSINOPHIL NFR BLD AUTO: 1.2 % — SIGNIFICANT CHANGE UP (ref 0–6)
HCT VFR BLD CALC: 50.1 % — HIGH (ref 39–50)
HGB BLD-MCNC: 16.4 G/DL — SIGNIFICANT CHANGE UP (ref 13–17)
LYMPHOCYTES # BLD AUTO: 1.9 K/UL — SIGNIFICANT CHANGE UP (ref 1–3.3)
LYMPHOCYTES # BLD AUTO: 15.8 % — SIGNIFICANT CHANGE UP (ref 13–44)
MCHC RBC-ENTMCNC: 29.4 PG — SIGNIFICANT CHANGE UP (ref 27–34)
MCHC RBC-ENTMCNC: 32.8 G/DL — SIGNIFICANT CHANGE UP (ref 32–36)
MCV RBC AUTO: 89.5 FL — SIGNIFICANT CHANGE UP (ref 80–100)
MONOCYTES # BLD AUTO: 1 K/UL — HIGH (ref 0–0.9)
MONOCYTES NFR BLD AUTO: 8.2 % — SIGNIFICANT CHANGE UP (ref 2–14)
NEUTROPHILS # BLD AUTO: 9 K/UL — HIGH (ref 1.8–7.4)
NEUTROPHILS NFR BLD AUTO: 73.7 % — SIGNIFICANT CHANGE UP (ref 43–77)
PLATELET # BLD AUTO: 324 K/UL — SIGNIFICANT CHANGE UP (ref 150–400)
RBC # BLD: 5.6 M/UL — SIGNIFICANT CHANGE UP (ref 4.2–5.8)
RBC # FLD: 11.1 % — SIGNIFICANT CHANGE UP (ref 10.3–14.5)
WBC # BLD: 12.2 K/UL — HIGH (ref 3.8–10.5)
WBC # FLD AUTO: 12.2 K/UL — HIGH (ref 3.8–10.5)

## 2020-11-24 PROCEDURE — G6002: CPT | Mod: 26

## 2020-11-25 ENCOUNTER — OUTPATIENT (OUTPATIENT)
Dept: OUTPATIENT SERVICES | Facility: HOSPITAL | Age: 55
LOS: 1 days | Discharge: HOME | End: 2020-11-25

## 2020-11-25 DIAGNOSIS — E86.0 DEHYDRATION: ICD-10-CM

## 2020-11-25 DIAGNOSIS — R11.2 NAUSEA WITH VOMITING, UNSPECIFIED: ICD-10-CM

## 2020-11-25 DIAGNOSIS — C09.9 MALIGNANT NEOPLASM OF TONSIL, UNSPECIFIED: ICD-10-CM

## 2020-11-25 DIAGNOSIS — D10.4 BENIGN NEOPLASM OF TONSIL: ICD-10-CM

## 2020-11-25 DIAGNOSIS — Z98.890 OTHER SPECIFIED POSTPROCEDURAL STATES: Chronic | ICD-10-CM

## 2020-11-25 DIAGNOSIS — Z51.11 ENCOUNTER FOR ANTINEOPLASTIC CHEMOTHERAPY: ICD-10-CM

## 2020-11-25 DIAGNOSIS — C76.0 MALIGNANT NEOPLASM OF HEAD, FACE AND NECK: ICD-10-CM

## 2020-11-25 PROCEDURE — G6002: CPT | Mod: 26

## 2020-11-30 ENCOUNTER — NON-APPOINTMENT (OUTPATIENT)
Age: 55
End: 2020-11-30

## 2020-11-30 VITALS
OXYGEN SATURATION: 98 % | DIASTOLIC BLOOD PRESSURE: 90 MMHG | BODY MASS INDEX: 24.43 KG/M2 | SYSTOLIC BLOOD PRESSURE: 130 MMHG | TEMPERATURE: 98 F | WEIGHT: 152 LBS | HEART RATE: 75 BPM | HEIGHT: 66 IN

## 2020-11-30 PROCEDURE — G6002: CPT | Mod: 26

## 2020-11-30 NOTE — REASON FOR VISIT
[Routine On-Treatment] : a routine on-treatment visit for [[employer] ] : provided by [employer]   [FreeTextEntry1] : [id number] [FreeTextEntry2] : Raya [TWNoteComboBox1] : Cymro

## 2020-11-30 NOTE — VITALS
[Maximal Pain Intensity: 0/10] : 0/10 [Least Pain Intensity: 0/10] : 0/10 [NoTreatment Scheduled] : no treatment scheduled [90: Able to carry normal activity; minor signs or symptoms of disease.] : 90: Able to carry normal activity; minor signs or symptoms of disease.  [ECOG Performance Status: 2 - Ambulatory and capable of all self care but unable to carry out any work activities] : Performance Status: 2 - Ambulatory and capable of all self care but unable to carry out any work activities. Up and about more than 50% of waking hours

## 2020-11-30 NOTE — DISEASE MANAGEMENT
[Clinical] : TNM Stage: c [III] : III [FreeTextEntry4] : squamous cell carcinoma [TTNM] : 4 [NTNM] : 2 [MTNM] : 0 [de-identified] : 600 cGy [de-identified] : 7000 cGy [de-identified] : tonsillar fossa

## 2020-12-01 ENCOUNTER — LABORATORY RESULT (OUTPATIENT)
Age: 55
End: 2020-12-01

## 2020-12-01 ENCOUNTER — RESULT REVIEW (OUTPATIENT)
Age: 55
End: 2020-12-01

## 2020-12-01 ENCOUNTER — APPOINTMENT (OUTPATIENT)
Dept: HEMATOLOGY ONCOLOGY | Facility: CLINIC | Age: 55
End: 2020-12-01

## 2020-12-01 ENCOUNTER — APPOINTMENT (OUTPATIENT)
Age: 55
End: 2020-12-01

## 2020-12-01 LAB
BASOPHILS # BLD AUTO: 0.1 K/UL — SIGNIFICANT CHANGE UP (ref 0–0.2)
BASOPHILS NFR BLD AUTO: 0.8 % — SIGNIFICANT CHANGE UP (ref 0–2)
EOSINOPHIL # BLD AUTO: 0.2 K/UL — SIGNIFICANT CHANGE UP (ref 0–0.5)
EOSINOPHIL NFR BLD AUTO: 1.3 % — SIGNIFICANT CHANGE UP (ref 0–6)
HCT VFR BLD CALC: 46.2 % — SIGNIFICANT CHANGE UP (ref 39–50)
HGB BLD-MCNC: 15.4 G/DL — SIGNIFICANT CHANGE UP (ref 13–17)
LYMPHOCYTES # BLD AUTO: 1.8 K/UL — SIGNIFICANT CHANGE UP (ref 1–3.3)
LYMPHOCYTES # BLD AUTO: 15.4 % — SIGNIFICANT CHANGE UP (ref 13–44)
MCHC RBC-ENTMCNC: 30 PG — SIGNIFICANT CHANGE UP (ref 27–34)
MCHC RBC-ENTMCNC: 33.4 G/DL — SIGNIFICANT CHANGE UP (ref 32–36)
MCV RBC AUTO: 89.9 FL — SIGNIFICANT CHANGE UP (ref 80–100)
MONOCYTES # BLD AUTO: 1.1 K/UL — HIGH (ref 0–0.9)
MONOCYTES NFR BLD AUTO: 9.5 % — SIGNIFICANT CHANGE UP (ref 2–14)
NEUTROPHILS # BLD AUTO: 8.7 K/UL — HIGH (ref 1.8–7.4)
NEUTROPHILS NFR BLD AUTO: 72.9 % — SIGNIFICANT CHANGE UP (ref 43–77)
PLATELET # BLD AUTO: 301 K/UL — SIGNIFICANT CHANGE UP (ref 150–400)
RBC # BLD: 5.14 M/UL — SIGNIFICANT CHANGE UP (ref 4.2–5.8)
RBC # FLD: 11.2 % — SIGNIFICANT CHANGE UP (ref 10.3–14.5)
WBC # BLD: 11.9 K/UL — HIGH (ref 3.8–10.5)
WBC # FLD AUTO: 11.9 K/UL — HIGH (ref 3.8–10.5)

## 2020-12-01 PROCEDURE — G6002: CPT | Mod: 26

## 2020-12-02 DIAGNOSIS — C76.0 MALIGNANT NEOPLASM OF HEAD, FACE AND NECK: ICD-10-CM

## 2020-12-02 DIAGNOSIS — C09.9 MALIGNANT NEOPLASM OF TONSIL, UNSPECIFIED: ICD-10-CM

## 2020-12-02 DIAGNOSIS — E86.0 DEHYDRATION: ICD-10-CM

## 2020-12-02 DIAGNOSIS — Z51.11 ENCOUNTER FOR ANTINEOPLASTIC CHEMOTHERAPY: ICD-10-CM

## 2020-12-02 DIAGNOSIS — R11.2 NAUSEA WITH VOMITING, UNSPECIFIED: ICD-10-CM

## 2020-12-02 PROCEDURE — 77427 RADIATION TX MANAGEMENT X5: CPT

## 2020-12-02 PROCEDURE — 77014: CPT | Mod: 26

## 2020-12-03 PROCEDURE — G6002: CPT | Mod: 26

## 2020-12-04 PROCEDURE — G6002: CPT | Mod: 26

## 2020-12-07 ENCOUNTER — NON-APPOINTMENT (OUTPATIENT)
Age: 55
End: 2020-12-07

## 2020-12-07 VITALS
DIASTOLIC BLOOD PRESSURE: 100 MMHG | HEART RATE: 80 BPM | RESPIRATION RATE: 16 BRPM | OXYGEN SATURATION: 96 % | SYSTOLIC BLOOD PRESSURE: 151 MMHG | WEIGHT: 149 LBS | BODY MASS INDEX: 24.05 KG/M2

## 2020-12-07 PROCEDURE — G6002: CPT | Mod: 26

## 2020-12-07 NOTE — REASON FOR VISIT
[Routine On-Treatment] : a routine on-treatment visit for [[employer] ] : provided by [employer]   [FreeTextEntry1] : [id number] [FreeTextEntry2] : Jarrod [TWNoteComboBox1] : Mauritian

## 2020-12-07 NOTE — DISEASE MANAGEMENT
[Clinical] : TNM Stage: c [FreeTextEntry4] : squamous cell carcinoma [TTNM] : 4 [NTNM] : 2 [MTNM] : 0 [III] : III [de-identified] : 1400 cGy [de-identified] : 7000 cGy [de-identified] : tonsillar fossa

## 2020-12-08 ENCOUNTER — LABORATORY RESULT (OUTPATIENT)
Age: 55
End: 2020-12-08

## 2020-12-08 ENCOUNTER — APPOINTMENT (OUTPATIENT)
Age: 55
End: 2020-12-08

## 2020-12-08 ENCOUNTER — RESULT REVIEW (OUTPATIENT)
Age: 55
End: 2020-12-08

## 2020-12-08 LAB
BASOPHILS # BLD AUTO: 0.1 K/UL — SIGNIFICANT CHANGE UP (ref 0–0.2)
BASOPHILS NFR BLD AUTO: 0.8 % — SIGNIFICANT CHANGE UP (ref 0–2)
EOSINOPHIL # BLD AUTO: 0.1 K/UL — SIGNIFICANT CHANGE UP (ref 0–0.5)
EOSINOPHIL NFR BLD AUTO: 0.6 % — SIGNIFICANT CHANGE UP (ref 0–6)
HCT VFR BLD CALC: 40.8 % — SIGNIFICANT CHANGE UP (ref 39–50)
HGB BLD-MCNC: 13.6 G/DL — SIGNIFICANT CHANGE UP (ref 13–17)
LYMPHOCYTES # BLD AUTO: 1 K/UL — SIGNIFICANT CHANGE UP (ref 1–3.3)
LYMPHOCYTES # BLD AUTO: 8.9 % — LOW (ref 13–44)
MCHC RBC-ENTMCNC: 30 PG — SIGNIFICANT CHANGE UP (ref 27–34)
MCHC RBC-ENTMCNC: 33.5 G/DL — SIGNIFICANT CHANGE UP (ref 32–36)
MCV RBC AUTO: 89.6 FL — SIGNIFICANT CHANGE UP (ref 80–100)
MONOCYTES # BLD AUTO: 1 K/UL — HIGH (ref 0–0.9)
MONOCYTES NFR BLD AUTO: 9 % — SIGNIFICANT CHANGE UP (ref 2–14)
NEUTROPHILS # BLD AUTO: 8.7 K/UL — HIGH (ref 1.8–7.4)
NEUTROPHILS NFR BLD AUTO: 80.7 % — HIGH (ref 43–77)
PLATELET # BLD AUTO: 204 K/UL — SIGNIFICANT CHANGE UP (ref 150–400)
RBC # BLD: 4.55 M/UL — SIGNIFICANT CHANGE UP (ref 4.2–5.8)
RBC # FLD: 11.6 % — SIGNIFICANT CHANGE UP (ref 10.3–14.5)
WBC # BLD: 10.7 K/UL — HIGH (ref 3.8–10.5)
WBC # FLD AUTO: 10.7 K/UL — HIGH (ref 3.8–10.5)

## 2020-12-08 PROCEDURE — G6002: CPT | Mod: 26

## 2020-12-09 PROCEDURE — 77014: CPT | Mod: 26

## 2020-12-09 PROCEDURE — 77427 RADIATION TX MANAGEMENT X5: CPT

## 2020-12-10 ENCOUNTER — NON-APPOINTMENT (OUTPATIENT)
Age: 55
End: 2020-12-10

## 2020-12-10 VITALS
SYSTOLIC BLOOD PRESSURE: 129 MMHG | HEART RATE: 89 BPM | BODY MASS INDEX: 23.78 KG/M2 | OXYGEN SATURATION: 96 % | HEIGHT: 66 IN | WEIGHT: 148 LBS | DIASTOLIC BLOOD PRESSURE: 87 MMHG

## 2020-12-10 PROCEDURE — G6002: CPT | Mod: 26

## 2020-12-10 NOTE — REASON FOR VISIT
[Routine On-Treatment] : a routine on-treatment visit for [[employer] ] : provided by [employer]   [FreeTextEntry2] : Jose [TWNoteComboBox1] : Saudi Arabian

## 2020-12-10 NOTE — DISEASE MANAGEMENT
[Clinical] : TNM Stage: c [FreeTextEntry4] : squamous cell carcinoma [TTNM] : 4 [NTNM] : 2 [MTNM] : 0 [III] : III [de-identified] : 2200 cGy [de-identified] : 7000 cGy [de-identified] : tonsillar fossa

## 2020-12-11 ENCOUNTER — APPOINTMENT (OUTPATIENT)
Dept: HEMATOLOGY ONCOLOGY | Facility: CLINIC | Age: 55
End: 2020-12-11
Payer: COMMERCIAL

## 2020-12-11 VITALS
SYSTOLIC BLOOD PRESSURE: 116 MMHG | OXYGEN SATURATION: 97 % | BODY MASS INDEX: 23.63 KG/M2 | HEART RATE: 109 BPM | DIASTOLIC BLOOD PRESSURE: 81 MMHG | WEIGHT: 147.03 LBS | HEIGHT: 66 IN

## 2020-12-11 PROCEDURE — G6002: CPT | Mod: 26

## 2020-12-11 PROCEDURE — 99213 OFFICE O/P EST LOW 20 MIN: CPT

## 2020-12-11 PROCEDURE — 99072 ADDL SUPL MATRL&STAF TM PHE: CPT

## 2020-12-14 ENCOUNTER — NON-APPOINTMENT (OUTPATIENT)
Age: 55
End: 2020-12-14

## 2020-12-14 VITALS
HEART RATE: 101 BPM | WEIGHT: 147.13 LBS | BODY MASS INDEX: 23.75 KG/M2 | SYSTOLIC BLOOD PRESSURE: 133 MMHG | OXYGEN SATURATION: 100 % | DIASTOLIC BLOOD PRESSURE: 93 MMHG

## 2020-12-14 PROCEDURE — G6002: CPT | Mod: 26

## 2020-12-14 NOTE — REASON FOR VISIT
[Routine On-Treatment] : a routine on-treatment visit for [[employer] ] : provided by [employer]   [FreeTextEntry1] : [id number] [FreeTextEntry2] : Jose [TWNoteComboBox1] : Bermudian

## 2020-12-14 NOTE — DISEASE MANAGEMENT
[Clinical] : TNM Stage: c [FreeTextEntry4] : squamous cell carcinoma [TTNM] : 4 [NTNM] : 2 [MTNM] : 0 [III] : III [de-identified] : 2600 cGy [de-identified] : 7000 cGy [de-identified] : tonsillar fossa

## 2020-12-14 NOTE — HISTORY OF PRESENT ILLNESS
[T: ___] : T[unfilled] [N: ___] : N[unfilled] [M: ___] : M[unfilled] [AJCC Stage: ____] : AJCC Stage: [unfilled] [de-identified] : The patient was diagnosed with SCCA  of the right tonsil in October 2020 at the age of 55.  He presented to ENT, Dr. Freddie Navarrete, c/o worsening sore throat, dysphagia and right ear pain.  Physical exam showed a firm mass encompassing the right tonsil.  CT neck showed a large heterogeneously enhancing mass centered in the right palatine tonsil with invasion of the right medial pterygoid muscle and mass effect on and concern for invasion of right base of tongue. This mass measures approximately 4.2 x 4.5 x 4.6 cm. There is associated effacement of the right parapharyngeal fat, right vallecula, and right piriform sinus. The oropharyngeal airway is mildly narrowed and deviated to the left.  There are mildly prominent although subcentimeter right level II lymph nodes measuring up to 0.8 cm in short axis diameter and level IIa and 0.6 mm in short axis diameter level in level IIb. Biopsy with Dr. Navarrete c/w squamous cell carcinoma, non-keratinizing, consistent with HPV-associated squamous cell carcinoma. The outside immunostains show p16, pancytokerain AE1/AE3, Cam5.2, and p63 are positive. \par He next saw Dr. Juan Baez where a laryngoscopy showed a right tonsil mass involving the soft palate, the right lateral pharyngeal wall, BOT. This completely obstructs the OPx on the right but the left side is patent. There doesn't appear to be any involvement of the epiglottis and the larynx. VC are mobile, airway patent.  Staging PET showed an FDG avid mass centered in the right palatine tonsil with invasion of the right medial pterygoid muscle and probable direct invasion of the right base of tongue (SUV 16.1). Mass approaches but does not extend across midline. Effacement of the right parapharyngeal fat, right vallecula, and right piriform sinus. Deviation of the oropharyngeal airway towards the left with mild associated narrowing, unchanged since October 7, 2020.  FDG avid focus right posterior nasal cavity (SUV 6.5) and in the region of the torus tubarius (SUV 4.9).  FDG avid right level 2 cervical lymph nodes:  Right level IIa, 1.0 cm, (SUV 3.4).  Right level IIb, 0.7 cm (SUV 3.2).  FDG avid left level IIa lymph node 1.0 cm (SUV 3.2). Diffuse enlargement of the thyroid gland with uniform FDG avidity (SUV 6.8).  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  [de-identified] : Patient presents on C3D4 CRT with weekly Cisplatin for SCCA  of the right tonsil.  + N/V.  + Mild odynophagia, no dysphagia.  + Excessive thick oral secretions.  + Xerostomia. + Weight loss. No otalgia. No erythema around neck. No fevers, no cough, no SOB.

## 2020-12-14 NOTE — PHYSICAL EXAM
[Restricted in physically strenuous activity but ambulatory and able to carry out work of a light or sedentary nature] : Status 1- Restricted in physically strenuous activity but ambulatory and able to carry out work of a light or sedentary nature, e.g., light house work, office work [Normal] : affect appropriate [de-identified] :  Significant trismus with interincisor opening of approx. 1.5 cm. Large R. tonsil mass involving the entire right timi soft palate and essentially completely obstructing the R. half of the OPx with involvement of the BOT. \par Significant trismus with interincisor opening of approx. 1.5 cm. Large R. tonsil mass involving the entire right timi soft palate and essentially completely obstructing the R. half of the OPx with involvement of the BOT

## 2020-12-15 ENCOUNTER — LABORATORY RESULT (OUTPATIENT)
Age: 55
End: 2020-12-15

## 2020-12-15 ENCOUNTER — RESULT REVIEW (OUTPATIENT)
Age: 55
End: 2020-12-15

## 2020-12-15 ENCOUNTER — APPOINTMENT (OUTPATIENT)
Age: 55
End: 2020-12-15

## 2020-12-15 LAB
ANISOCYTOSIS BLD QL: SLIGHT — SIGNIFICANT CHANGE UP
BASOPHILS # BLD AUTO: 0.1 K/UL — SIGNIFICANT CHANGE UP (ref 0–0.2)
EOSINOPHIL # BLD AUTO: 0.1 K/UL — SIGNIFICANT CHANGE UP (ref 0–0.5)
EOSINOPHIL NFR BLD AUTO: 1 % — SIGNIFICANT CHANGE UP (ref 0–6)
GIANT PLATELETS BLD QL SMEAR: PRESENT — SIGNIFICANT CHANGE UP
HCT VFR BLD CALC: 39.8 % — SIGNIFICANT CHANGE UP (ref 39–50)
HGB BLD-MCNC: 13.3 G/DL — SIGNIFICANT CHANGE UP (ref 13–17)
LG PLATELETS BLD QL AUTO: SLIGHT — SIGNIFICANT CHANGE UP
LYMPHOCYTES # BLD AUTO: 0.8 K/UL — LOW (ref 1–3.3)
LYMPHOCYTES # BLD AUTO: 13 % — SIGNIFICANT CHANGE UP (ref 13–44)
MACROCYTES BLD QL: SLIGHT — SIGNIFICANT CHANGE UP
MCHC RBC-ENTMCNC: 30.5 PG — SIGNIFICANT CHANGE UP (ref 27–34)
MCHC RBC-ENTMCNC: 33.5 G/DL — SIGNIFICANT CHANGE UP (ref 32–36)
MCV RBC AUTO: 91 FL — SIGNIFICANT CHANGE UP (ref 80–100)
MICROCYTES BLD QL: SLIGHT — SIGNIFICANT CHANGE UP
MONOCYTES # BLD AUTO: 0.9 K/UL — SIGNIFICANT CHANGE UP (ref 0–0.9)
MONOCYTES NFR BLD AUTO: 7 % — SIGNIFICANT CHANGE UP (ref 2–14)
NEUTROPHILS # BLD AUTO: 6.3 K/UL — SIGNIFICANT CHANGE UP (ref 1.8–7.4)
NEUTROPHILS NFR BLD AUTO: 79 % — HIGH (ref 43–77)
OVALOCYTES BLD QL SMEAR: SLIGHT — SIGNIFICANT CHANGE UP
PLAT MORPH BLD: NORMAL — SIGNIFICANT CHANGE UP
PLATELET # BLD AUTO: 186 K/UL — SIGNIFICANT CHANGE UP (ref 150–400)
POIKILOCYTOSIS BLD QL AUTO: SLIGHT — SIGNIFICANT CHANGE UP
POLYCHROMASIA BLD QL SMEAR: SLIGHT — SIGNIFICANT CHANGE UP
RBC # BLD: 4.37 M/UL — SIGNIFICANT CHANGE UP (ref 4.2–5.8)
RBC # FLD: 12.7 % — SIGNIFICANT CHANGE UP (ref 10.3–14.5)
RBC BLD AUTO: SIGNIFICANT CHANGE UP
WBC # BLD: 8.1 K/UL — SIGNIFICANT CHANGE UP (ref 3.8–10.5)
WBC # FLD AUTO: 8.1 K/UL — SIGNIFICANT CHANGE UP (ref 3.8–10.5)

## 2020-12-15 PROCEDURE — G6002: CPT | Mod: 26

## 2020-12-15 RX ORDER — ATENOLOL 25 MG/1
1 TABLET ORAL
Qty: 0 | Refills: 0 | DISCHARGE

## 2020-12-16 PROCEDURE — 77427 RADIATION TX MANAGEMENT X5: CPT

## 2020-12-16 PROCEDURE — 77014: CPT | Mod: 26

## 2020-12-17 PROCEDURE — G6002: CPT | Mod: 26

## 2020-12-18 ENCOUNTER — APPOINTMENT (OUTPATIENT)
Dept: OTOLARYNGOLOGY | Facility: CLINIC | Age: 55
End: 2020-12-18
Payer: COMMERCIAL

## 2020-12-18 VITALS — HEART RATE: 90 BPM | DIASTOLIC BLOOD PRESSURE: 93 MMHG | SYSTOLIC BLOOD PRESSURE: 136 MMHG

## 2020-12-18 PROCEDURE — 31575 DIAGNOSTIC LARYNGOSCOPY: CPT

## 2020-12-18 PROCEDURE — 99072 ADDL SUPL MATRL&STAF TM PHE: CPT

## 2020-12-18 PROCEDURE — 99214 OFFICE O/P EST MOD 30 MIN: CPT | Mod: 25

## 2020-12-18 PROCEDURE — G6002: CPT | Mod: 26

## 2020-12-18 NOTE — PROCEDURE
[Trismus] : trismus preventing mirror examination [None] : none [Flexible Endoscope] : examined with the flexible endoscope [Serial Number: ___] : Serial Number: [unfilled] [de-identified] : Patient with right tonsil mass involving the soft palate, the right lateral pharyngeal wall, BOT. The R. OPx is significantly more patent now consistent with response to treatment.  There doesn't appear to be any involvement of the epiglottis and the larynx. VC are mobile, airway patent. \par

## 2020-12-18 NOTE — PHYSICAL EXAM
[Normal] : no rashes [Laryngoscopy Performed] : laryngoscopy was performed, see procedure section for findings [FreeTextEntry1] : Significant trismus with interincisor opening of approx. 1.5 cm.  Large R. tonsil mass involving the entire right timi soft palate which appears smaller than last exam and not completely obstructing the R. half of the OPx as previously seen.

## 2020-12-18 NOTE — HISTORY OF PRESENT ILLNESS
[de-identified] : Pt is here to f/up.  PT is approximately mid treatment (CXRT) for tonsil cancer.  Pt c/o nausea, otherwise he is handling the treatment well.  Pt is eating well and maintaining his weight.  He denies any dyspnea.

## 2020-12-18 NOTE — REASON FOR VISIT
[Subsequent Evaluation] : a subsequent evaluation for [[employer] ] : provided by [employer]   [FreeTextEntry1] : [id number] [FreeTextEntry2] : [place] [TWNoteComboBox1] : Burmese

## 2020-12-19 ENCOUNTER — OUTPATIENT (OUTPATIENT)
Dept: OUTPATIENT SERVICES | Facility: HOSPITAL | Age: 55
LOS: 1 days | Discharge: HOME | End: 2020-12-19

## 2020-12-19 DIAGNOSIS — Z98.890 OTHER SPECIFIED POSTPROCEDURAL STATES: Chronic | ICD-10-CM

## 2020-12-19 DIAGNOSIS — D10.4 BENIGN NEOPLASM OF TONSIL: ICD-10-CM

## 2020-12-21 ENCOUNTER — NON-APPOINTMENT (OUTPATIENT)
Age: 55
End: 2020-12-21

## 2020-12-21 VITALS
HEART RATE: 89 BPM | SYSTOLIC BLOOD PRESSURE: 120 MMHG | DIASTOLIC BLOOD PRESSURE: 90 MMHG | RESPIRATION RATE: 16 BRPM | OXYGEN SATURATION: 100 % | WEIGHT: 143.4 LBS | BODY MASS INDEX: 23.15 KG/M2

## 2020-12-21 PROCEDURE — G6002: CPT | Mod: 26

## 2020-12-21 NOTE — DISEASE MANAGEMENT
[Clinical] : TNM Stage: c [III] : III [FreeTextEntry4] : squamous cell carcinoma [TTNM] : 4 [NTNM] : 2 [MTNM] : 0 [de-identified] : 3600 cGy [de-identified] : 7000 cGy [de-identified] : tonsillar fossa

## 2020-12-21 NOTE — REASON FOR VISIT
[Routine On-Treatment] : a routine on-treatment visit for [[employer] ] : provided by [employer]   [Head and Neck Cancer] : head and neck cancer [FreeTextEntry1] : [id number] [FreeTextEntry2] : Jose [TWNoteComboBox1] : Nicaraguan

## 2020-12-22 ENCOUNTER — LABORATORY RESULT (OUTPATIENT)
Age: 55
End: 2020-12-22

## 2020-12-22 ENCOUNTER — RESULT REVIEW (OUTPATIENT)
Age: 55
End: 2020-12-22

## 2020-12-22 ENCOUNTER — APPOINTMENT (OUTPATIENT)
Age: 55
End: 2020-12-22

## 2020-12-22 LAB
BASOPHILS # BLD AUTO: 0.1 K/UL — SIGNIFICANT CHANGE UP (ref 0–0.2)
BASOPHILS NFR BLD AUTO: 1.2 % — SIGNIFICANT CHANGE UP (ref 0–2)
EOSINOPHIL # BLD AUTO: 0.1 K/UL — SIGNIFICANT CHANGE UP (ref 0–0.5)
EOSINOPHIL NFR BLD AUTO: 1.4 % — SIGNIFICANT CHANGE UP (ref 0–6)
HCT VFR BLD CALC: 39.5 % — SIGNIFICANT CHANGE UP (ref 39–50)
HGB BLD-MCNC: 13.1 G/DL — SIGNIFICANT CHANGE UP (ref 13–17)
LYMPHOCYTES # BLD AUTO: 0.7 K/UL — LOW (ref 1–3.3)
LYMPHOCYTES # BLD AUTO: 9.4 % — LOW (ref 13–44)
MCHC RBC-ENTMCNC: 30.7 PG — SIGNIFICANT CHANGE UP (ref 27–34)
MCHC RBC-ENTMCNC: 33.3 G/DL — SIGNIFICANT CHANGE UP (ref 32–36)
MCV RBC AUTO: 92.2 FL — SIGNIFICANT CHANGE UP (ref 80–100)
MONOCYTES # BLD AUTO: 0.8 K/UL — SIGNIFICANT CHANGE UP (ref 0–0.9)
MONOCYTES NFR BLD AUTO: 11.5 % — SIGNIFICANT CHANGE UP (ref 2–14)
NEUTROPHILS # BLD AUTO: 5.5 K/UL — SIGNIFICANT CHANGE UP (ref 1.8–7.4)
NEUTROPHILS NFR BLD AUTO: 76.4 % — SIGNIFICANT CHANGE UP (ref 43–77)
PLATELET # BLD AUTO: 146 K/UL — LOW (ref 150–400)
RBC # BLD: 4.28 M/UL — SIGNIFICANT CHANGE UP (ref 4.2–5.8)
RBC # FLD: 13.4 % — SIGNIFICANT CHANGE UP (ref 10.3–14.5)
WBC # BLD: 7.2 K/UL — SIGNIFICANT CHANGE UP (ref 3.8–10.5)
WBC # FLD AUTO: 7.2 K/UL — SIGNIFICANT CHANGE UP (ref 3.8–10.5)

## 2020-12-22 PROCEDURE — G6002: CPT | Mod: 26

## 2020-12-23 DIAGNOSIS — C09.9 MALIGNANT NEOPLASM OF TONSIL, UNSPECIFIED: ICD-10-CM

## 2020-12-23 DIAGNOSIS — R11.2 NAUSEA WITH VOMITING, UNSPECIFIED: ICD-10-CM

## 2020-12-23 DIAGNOSIS — C76.0 MALIGNANT NEOPLASM OF HEAD, FACE AND NECK: ICD-10-CM

## 2020-12-23 DIAGNOSIS — E86.0 DEHYDRATION: ICD-10-CM

## 2020-12-23 DIAGNOSIS — Z51.11 ENCOUNTER FOR ANTINEOPLASTIC CHEMOTHERAPY: ICD-10-CM

## 2020-12-23 PROCEDURE — 77014: CPT | Mod: 26

## 2020-12-23 PROCEDURE — 77427 RADIATION TX MANAGEMENT X5: CPT

## 2020-12-24 ENCOUNTER — NON-APPOINTMENT (OUTPATIENT)
Age: 55
End: 2020-12-24

## 2020-12-24 VITALS — WEIGHT: 146 LBS | BODY MASS INDEX: 23.57 KG/M2

## 2020-12-24 PROCEDURE — G6002: CPT | Mod: 26

## 2020-12-24 NOTE — REASON FOR VISIT
[Routine On-Treatment] : a routine on-treatment visit for [Head and Neck Cancer] : head and neck cancer [[employer] ] : provided by [employer]   [FreeTextEntry1] : [id number] [FreeTextEntry2] : Jose [TWNoteComboBox1] : Barbadian

## 2020-12-24 NOTE — DISEASE MANAGEMENT
[Clinical] : TNM Stage: c [FreeTextEntry4] : squamous cell carcinoma [TTNM] : 4 [NTNM] : 2 [MTNM] : 0 [III] : III [de-identified] : 4200 cGy [de-identified] : 7000 cGy [de-identified] : tonsillar fossa

## 2020-12-28 ENCOUNTER — APPOINTMENT (OUTPATIENT)
Dept: HEMATOLOGY ONCOLOGY | Facility: CLINIC | Age: 55
End: 2020-12-28
Payer: COMMERCIAL

## 2020-12-28 VITALS
RESPIRATION RATE: 16 BRPM | TEMPERATURE: 97.6 F | HEART RATE: 96 BPM | HEIGHT: 66 IN | WEIGHT: 142 LBS | SYSTOLIC BLOOD PRESSURE: 159 MMHG | BODY MASS INDEX: 22.82 KG/M2 | OXYGEN SATURATION: 97 % | DIASTOLIC BLOOD PRESSURE: 92 MMHG

## 2020-12-28 VITALS
WEIGHT: 143.13 LBS | OXYGEN SATURATION: 97 % | DIASTOLIC BLOOD PRESSURE: 89 MMHG | HEART RATE: 116 BPM | SYSTOLIC BLOOD PRESSURE: 119 MMHG | HEIGHT: 60 IN | BODY MASS INDEX: 28.1 KG/M2

## 2020-12-28 DIAGNOSIS — B37.0 CANDIDAL STOMATITIS: ICD-10-CM

## 2020-12-28 PROCEDURE — 99072 ADDL SUPL MATRL&STAF TM PHE: CPT

## 2020-12-28 PROCEDURE — 99213 OFFICE O/P EST LOW 20 MIN: CPT

## 2020-12-28 PROCEDURE — G6002: CPT | Mod: 26

## 2020-12-28 NOTE — REASON FOR VISIT
[Routine On-Treatment] : a routine on-treatment visit for [Head and Neck Cancer] : head and neck cancer [[employer] ] : provided by [employer]   [FreeTextEntry1] : [id number] [FreeTextEntry2] : Melanis [TWNoteComboBox1] : Citizen of the [place]

## 2020-12-28 NOTE — VITALS
[Maximal Pain Intensity: 3/10] : 3/10 [Least Pain Intensity: 0/10] : 0/10 [Pain Description/Quality: ___] : Pain description/quality: [unfilled] [Pain Duration: ___] : Pain duration: [unfilled] [Pain Location: ___] : Pain Location: [unfilled] [Pain Interferes with ADLs] : Pain interferes with activities of daily living. [OTC] : OTC [80: Normal activity with effort; some signs or symptoms of disease.] : 80: Normal activity with effort; some signs or symptoms of disease.  [ECOG Performance Status: 2 - Ambulatory and capable of all self care but unable to carry out any work activities] : Performance Status: 2 - Ambulatory and capable of all self care but unable to carry out any work activities. Up and about more than 50% of waking hours

## 2020-12-28 NOTE — DISEASE MANAGEMENT
[Clinical] : TNM Stage: c [FreeTextEntry4] : squamous cell carcinoma [TTNM] : 4 [NTNM] : 2 [MTNM] : 0 [III] : III [de-identified] : 4600 cGy [de-identified] : 7000 cGy [de-identified] : tonsillar fossa

## 2020-12-29 ENCOUNTER — RESULT REVIEW (OUTPATIENT)
Age: 55
End: 2020-12-29

## 2020-12-29 ENCOUNTER — APPOINTMENT (OUTPATIENT)
Age: 55
End: 2020-12-29

## 2020-12-29 ENCOUNTER — LABORATORY RESULT (OUTPATIENT)
Age: 55
End: 2020-12-29

## 2020-12-29 PROBLEM — B37.0 ORAL CANDIDIASIS: Status: ACTIVE | Noted: 2020-12-21

## 2020-12-29 LAB
BASOPHILS # BLD AUTO: 0.1 K/UL — SIGNIFICANT CHANGE UP (ref 0–0.2)
BASOPHILS NFR BLD AUTO: 1.4 % — SIGNIFICANT CHANGE UP (ref 0–2)
EOSINOPHIL # BLD AUTO: 0.1 K/UL — SIGNIFICANT CHANGE UP (ref 0–0.5)
EOSINOPHIL NFR BLD AUTO: 2.1 % — SIGNIFICANT CHANGE UP (ref 0–6)
HCT VFR BLD CALC: 36.2 % — LOW (ref 39–50)
HGB BLD-MCNC: 12.6 G/DL — LOW (ref 13–17)
LYMPHOCYTES # BLD AUTO: 0.5 K/UL — LOW (ref 1–3.3)
LYMPHOCYTES # BLD AUTO: 10.6 % — LOW (ref 13–44)
MCHC RBC-ENTMCNC: 32.5 PG — SIGNIFICANT CHANGE UP (ref 27–34)
MCHC RBC-ENTMCNC: 34.9 G/DL — SIGNIFICANT CHANGE UP (ref 32–36)
MCV RBC AUTO: 93.2 FL — SIGNIFICANT CHANGE UP (ref 80–100)
MONOCYTES # BLD AUTO: 0.6 K/UL — SIGNIFICANT CHANGE UP (ref 0–0.9)
MONOCYTES NFR BLD AUTO: 11.7 % — SIGNIFICANT CHANGE UP (ref 2–14)
NEUTROPHILS # BLD AUTO: 3.7 K/UL — SIGNIFICANT CHANGE UP (ref 1.8–7.4)
NEUTROPHILS NFR BLD AUTO: 74.1 % — SIGNIFICANT CHANGE UP (ref 43–77)
PLATELET # BLD AUTO: 147 K/UL — LOW (ref 150–400)
RBC # BLD: 3.89 M/UL — LOW (ref 4.2–5.8)
RBC # FLD: 14.4 % — SIGNIFICANT CHANGE UP (ref 10.3–14.5)
WBC # BLD: 5 K/UL — SIGNIFICANT CHANGE UP (ref 3.8–10.5)
WBC # FLD AUTO: 5 K/UL — SIGNIFICANT CHANGE UP (ref 3.8–10.5)

## 2020-12-29 PROCEDURE — G6002: CPT | Mod: 26

## 2020-12-29 NOTE — PHYSICAL EXAM
[Restricted in physically strenuous activity but ambulatory and able to carry out work of a light or sedentary nature] : Status 1- Restricted in physically strenuous activity but ambulatory and able to carry out work of a light or sedentary nature, e.g., light house work, office work [Normal] : affect appropriate [de-identified] :  Significant trismus with interincisor opening of approx. 1.5 cm. Large R. tonsil mass involving the entire right timi soft palate and essentially completely obstructing the R. half of the OPx with involvement of the BOT. \par Significant trismus with interincisor opening of approx. 1.5 cm. Large R. tonsil mass involving the entire right timi soft palate and essentially completely obstructing the R. half of the OPx with involvement of the BOT

## 2020-12-29 NOTE — HISTORY OF PRESENT ILLNESS
[T: ___] : T[unfilled] [N: ___] : N[unfilled] [M: ___] : M[unfilled] [AJCC Stage: ____] : AJCC Stage: [unfilled] [de-identified] : The patient was diagnosed with SCCA  of the right tonsil in October 2020 at the age of 55.  He presented to ENT, Dr. Freddie Navarrete, c/o worsening sore throat, dysphagia and right ear pain.  Physical exam showed a firm mass encompassing the right tonsil.  CT neck showed a large heterogeneously enhancing mass centered in the right palatine tonsil with invasion of the right medial pterygoid muscle and mass effect on and concern for invasion of right base of tongue. This mass measures approximately 4.2 x 4.5 x 4.6 cm. There is associated effacement of the right parapharyngeal fat, right vallecula, and right piriform sinus. The oropharyngeal airway is mildly narrowed and deviated to the left.  There are mildly prominent although subcentimeter right level II lymph nodes measuring up to 0.8 cm in short axis diameter and level IIa and 0.6 mm in short axis diameter level in level IIb. Biopsy with Dr. Navarrete c/w squamous cell carcinoma, non-keratinizing, consistent with HPV-associated squamous cell carcinoma. The outside immunostains show p16, pancytokerain AE1/AE3, Cam5.2, and p63 are positive. \par He next saw Dr. Juan Baez where a laryngoscopy showed a right tonsil mass involving the soft palate, the right lateral pharyngeal wall, BOT. This completely obstructs the OPx on the right but the left side is patent. There doesn't appear to be any involvement of the epiglottis and the larynx. VC are mobile, airway patent.  Staging PET showed an FDG avid mass centered in the right palatine tonsil with invasion of the right medial pterygoid muscle and probable direct invasion of the right base of tongue (SUV 16.1). Mass approaches but does not extend across midline. Effacement of the right parapharyngeal fat, right vallecula, and right piriform sinus. Deviation of the oropharyngeal airway towards the left with mild associated narrowing, unchanged since October 7, 2020.  FDG avid focus right posterior nasal cavity (SUV 6.5) and in the region of the torus tubarius (SUV 4.9).  FDG avid right level 2 cervical lymph nodes:  Right level IIa, 1.0 cm, (SUV 3.4).  Right level IIb, 0.7 cm (SUV 3.2).  FDG avid left level IIa lymph node 1.0 cm (SUV 3.2). Diffuse enlargement of the thyroid gland with uniform FDG avidity (SUV 6.8).  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  [de-identified] : Patient presents on C5D7 CRT with weekly Cisplatin for SCCA  of the right tonsil.  + N/V, worse.  + Odynophagia worse, still no dysphagia.  + Excessive thick oral secretions, unchanged.  + Weight loss. No otalgia, no tinnitus. No erythema around neck. No fevers, no cough, no SOB.

## 2020-12-30 PROCEDURE — 77014: CPT | Mod: 26

## 2020-12-31 PROCEDURE — 77427 RADIATION TX MANAGEMENT X5: CPT

## 2020-12-31 PROCEDURE — G6002: CPT | Mod: 26

## 2021-01-04 VITALS
OXYGEN SATURATION: 96 % | BODY MASS INDEX: 27.48 KG/M2 | HEART RATE: 126 BPM | HEIGHT: 60 IN | TEMPERATURE: 98.2 F | RESPIRATION RATE: 17 BRPM | WEIGHT: 140 LBS | SYSTOLIC BLOOD PRESSURE: 113 MMHG | DIASTOLIC BLOOD PRESSURE: 84 MMHG

## 2021-01-04 PROCEDURE — G6002: CPT | Mod: 26

## 2021-01-04 NOTE — DISEASE MANAGEMENT
[Clinical] : TNM Stage: c [FreeTextEntry4] : squamous cell carcinoma [TTNM] : 4 [NTNM] : 2 [MTNM] : 0 [III] : III [de-identified] : 5200 cGy [de-identified] : 7000 cGy [de-identified] : tonsillar fossa

## 2021-01-04 NOTE — REASON FOR VISIT
[Routine On-Treatment] : a routine on-treatment visit for [Head and Neck Cancer] : head and neck cancer [[employer] ] : provided by [employer]   [FreeTextEntry1] : [id number] [FreeTextEntry2] : Ajay [TWNoteComboBox1] : Iraqi

## 2021-01-05 PROCEDURE — G6002: CPT | Mod: 26

## 2021-01-06 PROCEDURE — G6002: CPT | Mod: 26

## 2021-01-07 PROCEDURE — G6002: CPT | Mod: 26

## 2021-01-08 PROCEDURE — G6002: CPT | Mod: 26

## 2021-01-08 PROCEDURE — 77427 RADIATION TX MANAGEMENT X5: CPT

## 2021-01-11 ENCOUNTER — NON-APPOINTMENT (OUTPATIENT)
Age: 56
End: 2021-01-11

## 2021-01-11 VITALS
DIASTOLIC BLOOD PRESSURE: 85 MMHG | SYSTOLIC BLOOD PRESSURE: 114 MMHG | TEMPERATURE: 98.1 F | HEART RATE: 120 BPM | BODY MASS INDEX: 23.56 KG/M2 | OXYGEN SATURATION: 96 % | HEIGHT: 64 IN | RESPIRATION RATE: 16 BRPM | WEIGHT: 138 LBS

## 2021-01-11 PROCEDURE — G6002: CPT | Mod: 26

## 2021-01-11 NOTE — DISEASE MANAGEMENT
[Clinical] : TNM Stage: c [III] : III [FreeTextEntry4] : squamous cell carcinoma [TTNM] : 4 [NTNM] : 2 [MTNM] : 0 [de-identified] : 2130 cGy [de-identified] : 7000 cGy [de-identified] : tonsillar fossa

## 2021-01-11 NOTE — VITALS
[Least Pain Intensity: 0/10] : 0/10 [Pain Description/Quality: ___] : Pain description/quality: [unfilled] [Pain Duration: ___] : Pain duration: [unfilled] [Pain Location: ___] : Pain Location: [unfilled] [Pain Interferes with ADLs] : Pain interferes with activities of daily living. [OTC] : OTC [80: Normal activity with effort; some signs or symptoms of disease.] : 80: Normal activity with effort; some signs or symptoms of disease.  [ECOG Performance Status: 2 - Ambulatory and capable of all self care but unable to carry out any work activities] : Performance Status: 2 - Ambulatory and capable of all self care but unable to carry out any work activities. Up and about more than 50% of waking hours [Maximal Pain Intensity: 5/10] : 5/10 [Opioid] : opioid

## 2021-01-11 NOTE — REASON FOR VISIT
[Routine On-Treatment] : a routine on-treatment visit for [Head and Neck Cancer] : head and neck cancer [[employer] ] : provided by [employer]   [FreeTextEntry1] : [id number] [FreeTextEntry2] : Ana Maria [TWNoteComboBox1] : Guyanese

## 2021-01-11 NOTE — REVIEW OF SYSTEMS
[Dysphagia: Grade 3 - Severely altered eating/swallowing; tube feeding or TPN or hospitalization indicated] : Dysphagia: Grade 3 - Severely altered eating/swallowing; tube feeding or TPN or hospitalization indicated [Nausea: Grade 3 - Inadequate oral caloric or fluid intake; tube feeding, TPN, or hospitalization indicated] : Nausea: Grade 3 - Inadequate oral caloric or fluid intake; tube feeding, TPN, or hospitalization indicated none

## 2021-01-12 PROCEDURE — G6002: CPT | Mod: 26

## 2021-01-13 PROCEDURE — 77014: CPT | Mod: 26

## 2021-01-14 PROCEDURE — G6002: CPT | Mod: 26

## 2021-01-15 PROCEDURE — G6002: CPT | Mod: 26

## 2021-01-15 PROCEDURE — 77427 RADIATION TX MANAGEMENT X5: CPT

## 2021-01-25 ENCOUNTER — APPOINTMENT (OUTPATIENT)
Dept: OTOLARYNGOLOGY | Facility: CLINIC | Age: 56
End: 2021-01-25
Payer: COMMERCIAL

## 2021-01-25 VITALS
SYSTOLIC BLOOD PRESSURE: 137 MMHG | WEIGHT: 138 LBS | DIASTOLIC BLOOD PRESSURE: 99 MMHG | BODY MASS INDEX: 23.56 KG/M2 | HEART RATE: 121 BPM | HEIGHT: 64 IN

## 2021-01-25 PROCEDURE — 31575 DIAGNOSTIC LARYNGOSCOPY: CPT

## 2021-01-25 PROCEDURE — 99214 OFFICE O/P EST MOD 30 MIN: CPT | Mod: 25

## 2021-01-25 PROCEDURE — 99072 ADDL SUPL MATRL&STAF TM PHE: CPT

## 2021-01-25 NOTE — PROCEDURE
[Trismus] : trismus preventing mirror examination [None] : none [Flexible Endoscope] : examined with the flexible endoscope [Serial Number: ___] : Serial Number: [unfilled] [de-identified] : No lesions in the NPx, HPx or larynx.  There is essentially resolution of the R. OPx mass with residual fullness noted but without any obvious mucosal lesions - posttreatment changes? - with mucositis.  VC are mobile, airway patent. \par  [de-identified] : [facility]

## 2021-01-25 NOTE — REASON FOR VISIT
[Subsequent Evaluation] : a subsequent evaluation for [[employer] ] : provided by [employer]   [FreeTextEntry1] : [id number] [FreeTextEntry2] : Barb [TWNoteComboBox1] : Maldivian

## 2021-01-25 NOTE — PHYSICAL EXAM
[Normal] : no rashes [Laryngoscopy Performed] : laryngoscopy was performed, see procedure section for findings [de-identified] : Posttreatment changes, no LAD.   [FreeTextEntry1] : Significant trismus with interincisor opening of approx. 1.5 cm - stable.  Significant response to treatment with essentially resolution of previously seen R. tonsillar mass.  Moderate mucositis is noted.

## 2021-01-25 NOTE — HISTORY OF PRESENT ILLNESS
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 Pt finished tx last week.  Pt c/o throat pain and xerostomia.  Pt is NPO and takes ensure via the PEG.  He is not working with SLP.  He denies any dyspnea, otalgia.  He feels that his weight is stabilized.

## 2021-02-22 ENCOUNTER — OUTPATIENT (OUTPATIENT)
Dept: OUTPATIENT SERVICES | Facility: HOSPITAL | Age: 56
LOS: 1 days | Discharge: HOME | End: 2021-02-22

## 2021-02-22 ENCOUNTER — APPOINTMENT (OUTPATIENT)
Dept: OTOLARYNGOLOGY | Facility: CLINIC | Age: 56
End: 2021-02-22

## 2021-02-22 ENCOUNTER — APPOINTMENT (OUTPATIENT)
Dept: OTOLARYNGOLOGY | Facility: CLINIC | Age: 56
End: 2021-02-22
Payer: COMMERCIAL

## 2021-02-22 VITALS — TEMPERATURE: 95 F | DIASTOLIC BLOOD PRESSURE: 89 MMHG | SYSTOLIC BLOOD PRESSURE: 136 MMHG

## 2021-02-22 DIAGNOSIS — Z98.890 OTHER SPECIFIED POSTPROCEDURAL STATES: Chronic | ICD-10-CM

## 2021-02-22 DIAGNOSIS — C09.9 MALIGNANT NEOPLASM OF TONSIL, UNSPECIFIED: ICD-10-CM

## 2021-02-22 DIAGNOSIS — C76.0 MALIGNANT NEOPLASM OF HEAD, FACE AND NECK: ICD-10-CM

## 2021-02-22 PROCEDURE — 99072 ADDL SUPL MATRL&STAF TM PHE: CPT

## 2021-02-22 PROCEDURE — 99214 OFFICE O/P EST MOD 30 MIN: CPT | Mod: 25

## 2021-02-22 PROCEDURE — 31575 DIAGNOSTIC LARYNGOSCOPY: CPT

## 2021-02-22 NOTE — PROCEDURE
[Trismus] : trismus preventing mirror examination [None] : none [Flexible Endoscope] : examined with the flexible endoscope [Serial Number: ___] : Serial Number: [unfilled] [de-identified] : No lesions in the NPx, HPx or larynx. There is essentially resolution of the R. OPx mass with residual fullness noted but without any obvious mucosal lesions - improved from last visit - posttreatment changes? - with improvement in the mucositis. VC are mobile, airway patent.

## 2021-02-22 NOTE — REASON FOR VISIT
[Subsequent Evaluation] : a subsequent evaluation for [[employer] ] : provided by [employer]   [FreeTextEntry1] : [id number] [FreeTextEntry2] : gardenia [TWNoteComboBox1] : Armenian

## 2021-02-22 NOTE — HISTORY OF PRESENT ILLNESS
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 Pt finished tx.   Pt c/o discomfort with swallow,mouth no dry now. Pt able to tolerate PO. pt carlitos to soft food, rice, and pt is not using PEG tube for 2 weeks.  He is working with SLP and trimus is improving.  He denies any dyspnea, otalgia.  Gaining wt.

## 2021-02-22 NOTE — PHYSICAL EXAM
[Laryngoscopy Performed] : laryngoscopy was performed, see procedure section for findings [Normal] : no rashes [de-identified] : Posttreatment changes, no LAD.   [FreeTextEntry1] : Trismus is improved with interincisor opening of approx. 2 cm.  Significant response to treatment with essentially resolution of previously seen R. tonsillar mass.  Mucositis is improved.

## 2021-02-24 ENCOUNTER — APPOINTMENT (OUTPATIENT)
Dept: RADIATION ONCOLOGY | Facility: CLINIC | Age: 56
End: 2021-02-24
Payer: COMMERCIAL

## 2021-02-24 VITALS
WEIGHT: 135 LBS | HEART RATE: 90 BPM | RESPIRATION RATE: 16 BRPM | BODY MASS INDEX: 23.17 KG/M2 | OXYGEN SATURATION: 99 % | SYSTOLIC BLOOD PRESSURE: 129 MMHG | DIASTOLIC BLOOD PRESSURE: 90 MMHG

## 2021-02-24 DIAGNOSIS — J35.9 CHRONIC DISEASE OF TONSILS AND ADENOIDS, UNSPECIFIED: ICD-10-CM

## 2021-02-24 DIAGNOSIS — T45.1X5A NAUSEA WITH VOMITING, UNSPECIFIED: ICD-10-CM

## 2021-02-24 DIAGNOSIS — R11.2 NAUSEA WITH VOMITING, UNSPECIFIED: ICD-10-CM

## 2021-02-24 PROCEDURE — 99024 POSTOP FOLLOW-UP VISIT: CPT

## 2021-02-24 NOTE — REVIEW OF SYSTEMS
[Odynophagia] : odynophagia [Dysphagia] : no dysphagia [Loss of Hearing] : no loss of hearing [Hoarseness] : no hoarseness [Nosebleeds] : no nosebleeds [Mucosal Pain] : no mucosal pain [Negative] : Heme/Lymph [de-identified] : s/p chemotherapy [FreeTextEntry4] : mucous bothersome; spme persistent taste alterations.

## 2021-02-24 NOTE — DISEASE MANAGEMENT
[Clinical] : TNM Stage: c [III] : III [FreeTextEntry4] : squamous cell carcinoma [TTNM] : 4 [NTNM] : 2 [MTNM] : 0 [de-identified] : 7000 cGy [de-identified] : tonsillar fossa

## 2021-02-24 NOTE — REASON FOR VISIT
[Post-Treatment Evaluation] : post-treatment evaluation for [Head and Neck Cancer] : head and neck cancer [[employer] ] : provided by [employer]   [FreeTextEntry1] : [id number] [FreeTextEntry2] : Apurva [TWNoteComboBox1] : Taiwanese

## 2021-02-24 NOTE — VITALS
[Maximal Pain Intensity: 2/10] : 2/10 [Least Pain Intensity: 0/10] : 0/10 [Pain Description/Quality: ___] : Pain description/quality: [unfilled] [Pain Duration: ___] : Pain duration: [unfilled] [Pain Location: ___] : Pain Location: [unfilled] [Pain Interferes with ADLs] : Pain interferes with activities of daily living. [NoTreatment Scheduled] : no treatment scheduled [OTC] : OTC [80: Normal activity with effort; some signs or symptoms of disease.] : 80: Normal activity with effort; some signs or symptoms of disease.  [ECOG Performance Status: 1 - Restricted in physically strenuous activity but ambulatory and able to carry out work of a light or sedentary nature] : Performance Status: 1 - Restricted in physically strenuous activity but ambulatory and able to carry out work of a light or sedentary nature, e.g., light house work, office work

## 2021-02-24 NOTE — LETTER GREETING
[Dear Doctor] : Dear Doctor, [Follow-Up] : Your patient, [unfilled] was seen in my office today for follow-up [Please see my note below.] : Please see my note below. [FreeTextEntry2] : Dev MD Nestor\par Kaia Cain< MD

## 2021-02-24 NOTE — ASSESSMENT
[No evidence of disease] : No evidence of disease [FreeTextEntry1] : moderate degree of treatment related sequelae.

## 2021-02-24 NOTE — LETTER CLOSING
[FreeTextEntry3] : Lonnie Strong MD\par Physician in Chief\par Department of Radiation Medicine\par [facility] [place]\par [facility] [place]\par \par  of Radiation Medicine\par [facility]\par at  [facility]\par \par Radiation \par [facility]/\par [facility] [place]\par [address]\par [place], [place] [address]\par \par Tel: [phone]\par Fax: [phone]\par  [Sincerely yours,] : Sincerely yours,

## 2021-02-24 NOTE — HISTORY OF PRESENT ILLNESS
[Home] : at home, [unfilled] , at the time of the visit. [Medical Office: ([facility])___] : at the medical office located in  [FreeTextEntry1] : \par This 56 y/o man is conferencing  for post-treatment evaluation.  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 Rubio notes trismus has improved greatly; he continues working with SLP therapist..\par Reports eating and drinking by mouth most foods.  Not using PEG-tube except for daily flushing with water.\par Oral cavity free of debris, no mucositis. He does continued to be bothered by mucous.\par \par Follows with DR. Baez and Dr. Cain.\par

## 2021-02-25 ENCOUNTER — APPOINTMENT (OUTPATIENT)
Dept: HEMATOLOGY ONCOLOGY | Facility: CLINIC | Age: 56
End: 2021-02-25

## 2021-02-26 ENCOUNTER — RESULT REVIEW (OUTPATIENT)
Age: 56
End: 2021-02-26

## 2021-02-26 ENCOUNTER — APPOINTMENT (OUTPATIENT)
Dept: HEMATOLOGY ONCOLOGY | Facility: CLINIC | Age: 56
End: 2021-02-26
Payer: COMMERCIAL

## 2021-02-26 VITALS
SYSTOLIC BLOOD PRESSURE: 125 MMHG | HEIGHT: 64 IN | OXYGEN SATURATION: 96 % | TEMPERATURE: 98 F | HEART RATE: 80 BPM | WEIGHT: 136.01 LBS | DIASTOLIC BLOOD PRESSURE: 85 MMHG | BODY MASS INDEX: 23.22 KG/M2

## 2021-02-26 LAB
BASOPHILS # BLD AUTO: 0.1 K/UL — SIGNIFICANT CHANGE UP (ref 0–0.2)
BASOPHILS NFR BLD AUTO: 1 % — SIGNIFICANT CHANGE UP (ref 0–2)
EOSINOPHIL # BLD AUTO: 0.1 K/UL — SIGNIFICANT CHANGE UP (ref 0–0.5)
EOSINOPHIL NFR BLD AUTO: 2.3 % — SIGNIFICANT CHANGE UP (ref 0–6)
HCT VFR BLD CALC: 32.3 % — LOW (ref 39–50)
HGB BLD-MCNC: 10.4 G/DL — LOW (ref 13–17)
LYMPHOCYTES # BLD AUTO: 0.8 K/UL — LOW (ref 1–3.3)
LYMPHOCYTES # BLD AUTO: 15.1 % — SIGNIFICANT CHANGE UP (ref 13–44)
MCHC RBC-ENTMCNC: 32 PG — SIGNIFICANT CHANGE UP (ref 27–34)
MCHC RBC-ENTMCNC: 32.3 G/DL — SIGNIFICANT CHANGE UP (ref 32–36)
MCV RBC AUTO: 99.1 FL — SIGNIFICANT CHANGE UP (ref 80–100)
MONOCYTES # BLD AUTO: 0.5 K/UL — SIGNIFICANT CHANGE UP (ref 0–0.9)
MONOCYTES NFR BLD AUTO: 9.1 % — SIGNIFICANT CHANGE UP (ref 2–14)
NEUTROPHILS # BLD AUTO: 4 K/UL — SIGNIFICANT CHANGE UP (ref 1.8–7.4)
NEUTROPHILS NFR BLD AUTO: 72.4 % — SIGNIFICANT CHANGE UP (ref 43–77)
PLATELET # BLD AUTO: 151 K/UL — SIGNIFICANT CHANGE UP (ref 150–400)
RBC # BLD: 3.26 M/UL — LOW (ref 4.2–5.8)
RBC # FLD: 12.1 % — SIGNIFICANT CHANGE UP (ref 10.3–14.5)
WBC # BLD: 5.6 K/UL — SIGNIFICANT CHANGE UP (ref 3.8–10.5)
WBC # FLD AUTO: 5.6 K/UL — SIGNIFICANT CHANGE UP (ref 3.8–10.5)

## 2021-02-26 PROCEDURE — 99072 ADDL SUPL MATRL&STAF TM PHE: CPT

## 2021-02-26 PROCEDURE — 99213 OFFICE O/P EST LOW 20 MIN: CPT

## 2021-03-08 NOTE — PHYSICAL EXAM
[Restricted in physically strenuous activity but ambulatory and able to carry out work of a light or sedentary nature] : Status 1- Restricted in physically strenuous activity but ambulatory and able to carry out work of a light or sedentary nature, e.g., light house work, office work [Normal] : affect appropriate [de-identified] :  Significant trismus with interincisor opening of approx. 1.5 cm. Large R. tonsil mass involving the entire right timi soft palate and essentially completely obstructing the R. half of the OPx with involvement of the BOT. \par Significant trismus with interincisor opening of approx. 1.5 cm. Large R. tonsil mass involving the entire right timi soft palate and essentially completely obstructing the R. half of the OPx with involvement of the BOT

## 2021-03-08 NOTE — HISTORY OF PRESENT ILLNESS
[T: ___] : T[unfilled] [N: ___] : N[unfilled] [M: ___] : M[unfilled] [AJCC Stage: ____] : AJCC Stage: [unfilled] [de-identified] : The patient was diagnosed with SCCA  of the right tonsil in October 2020 at the age of 55.  He presented to ENT, Dr. Freddie Navarrete, c/o worsening sore throat, dysphagia and right ear pain.  Physical exam showed a firm mass encompassing the right tonsil.  CT neck showed a large heterogeneously enhancing mass centered in the right palatine tonsil with invasion of the right medial pterygoid muscle and mass effect on and concern for invasion of right base of tongue. This mass measures approximately 4.2 x 4.5 x 4.6 cm. There is associated effacement of the right parapharyngeal fat, right vallecula, and right piriform sinus. The oropharyngeal airway is mildly narrowed and deviated to the left.  There are mildly prominent although subcentimeter right level II lymph nodes measuring up to 0.8 cm in short axis diameter and level IIa and 0.6 mm in short axis diameter level in level IIb. Biopsy with Dr. Navarrtee c/w squamous cell carcinoma, non-keratinizing, consistent with HPV-associated squamous cell carcinoma. The outside immunostains show p16, pancytokerain AE1/AE3, Cam5.2, and p63 are positive. \par He next saw Dr. Juan Baez where a laryngoscopy showed a right tonsil mass involving the soft palate, the right lateral pharyngeal wall, BOT. This completely obstructs the OPx on the right but the left side is patent. There doesn't appear to be any involvement of the epiglottis and the larynx. VC are mobile, airway patent.  Staging PET showed an FDG avid mass centered in the right palatine tonsil with invasion of the right medial pterygoid muscle and probable direct invasion of the right base of tongue (SUV 16.1). Mass approaches but does not extend across midline. Effacement of the right parapharyngeal fat, right vallecula, and right piriform sinus. Deviation of the oropharyngeal airway towards the left with mild associated narrowing, unchanged since October 7, 2020.  FDG avid focus right posterior nasal cavity (SUV 6.5) and in the region of the torus tubarius (SUV 4.9).  FDG avid right level 2 cervical lymph nodes:  Right level IIa, 1.0 cm, (SUV 3.4).  Right level IIb, 0.7 cm (SUV 3.2).  FDG avid left level IIa lymph node 1.0 cm (SUV 3.2). Diffuse enlargement of the thyroid gland with uniform FDG avidity (SUV 6.8).  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  [de-identified] : Patient presents s/p C6 CRT with weekly Cisplatin for SCCA  of the right tonsil, completed RT on 1/15/21.  + Still with intermittent nausea, no emesis but improving since resuming PO nutrition.  + Odynophagia remains but mild.  + Intermittent dysphagia.  Attending SLP 2x/week and eating mostly soft foods, soups, rice etc. + Lymphedema around neck. Excessive thick oral secretions, nearly resolved.  No xerostomia.  Weight stable. No otalgia, no tinnitus. No erythema around neck. No fevers, no cough, no SOB.  \par \par Seen with  # [id number]

## 2021-03-09 ENCOUNTER — NON-APPOINTMENT (OUTPATIENT)
Age: 56
End: 2021-03-09

## 2021-03-09 LAB
ALBUMIN SERPL ELPH-MCNC: 3.9 G/DL
ALP BLD-CCNC: 46 U/L
ALT SERPL-CCNC: 13 U/L
ANION GAP SERPL CALC-SCNC: 11 MMOL/L
AST SERPL-CCNC: 14 U/L
BILIRUB SERPL-MCNC: 0.6 MG/DL
BUN SERPL-MCNC: 12 MG/DL
CALCIUM SERPL-MCNC: 8.9 MG/DL
CHLORIDE SERPL-SCNC: 101 MMOL/L
CO2 SERPL-SCNC: 27 MMOL/L
CREAT SERPL-MCNC: 0.74 MG/DL
GLUCOSE SERPL-MCNC: 97 MG/DL
MAGNESIUM SERPL-MCNC: 1.8 MG/DL
POTASSIUM SERPL-SCNC: 4 MMOL/L
PROT SERPL-MCNC: 6.2 G/DL
SODIUM SERPL-SCNC: 139 MMOL/L

## 2021-03-22 ENCOUNTER — APPOINTMENT (OUTPATIENT)
Dept: ENDOCRINOLOGY | Facility: CLINIC | Age: 56
End: 2021-03-22
Payer: COMMERCIAL

## 2021-03-22 ENCOUNTER — LABORATORY RESULT (OUTPATIENT)
Age: 56
End: 2021-03-22

## 2021-03-22 VITALS
HEIGHT: 64 IN | BODY MASS INDEX: 23.05 KG/M2 | SYSTOLIC BLOOD PRESSURE: 138 MMHG | WEIGHT: 135 LBS | DIASTOLIC BLOOD PRESSURE: 88 MMHG

## 2021-03-22 PROCEDURE — 99072 ADDL SUPL MATRL&STAF TM PHE: CPT

## 2021-03-22 PROCEDURE — 99203 OFFICE O/P NEW LOW 30 MIN: CPT

## 2021-03-22 NOTE — PHYSICAL EXAM
[Alert] : alert [Well Nourished] : well nourished [No Acute Distress] : no acute distress [Normal Sclera/Conjunctiva] : normal sclera/conjunctiva [Normal Hearing] : hearing was normal [No Accessory Muscle Use] : no accessory muscle use [Clear to Auscultation] : lungs were clear to auscultation bilaterally [Normal S1, S2] : normal S1 and S2 [Normal Rate] : heart rate was normal [No Edema] : no peripheral edema [Not Tender] : non-tender [Soft] : abdomen soft [Normal Gait] : normal gait [No Rash] : no rash [No Tremors] : no tremors [Oriented x3] : oriented to person, place, and time [de-identified] : enlarged neck- unable to differentiate between swollen from thyroid vs tonsils

## 2021-03-22 NOTE — REVIEW OF SYSTEMS
[Recent Weight Gain (___ Lbs)] : recent weight gain: [unfilled] lbs [Dysphagia] : dysphagia [Neck Pain] : neck pain [Fatigue] : no fatigue [Recent Weight Loss (___ Lbs)] : no recent weight loss [Dysphonia] : no dysphonia [Chest Pain] : no chest pain [Palpitations] : no palpitations [Shortness Of Breath] : no shortness of breath [Constipation] : no constipation [Diarrhea] : no diarrhea [Tremors] : no tremors [FreeTextEntry4] : "dry throat"

## 2021-03-22 NOTE — ASSESSMENT
[FreeTextEntry1] : Hyperthyroidism\par -Repeat TFTs and thyroid ultrasound to be performed- rule out graves disease vs thyroiditis secondary to treatment, no trending TFTs present to compare to\par \par \par RTO 6 weeks

## 2021-03-22 NOTE — HISTORY OF PRESENT ILLNESS
[FreeTextEntry1] : New patient presents with new onset hyperthyroid, likely 2/2 cancer treatment although appears thyroid was showing signs of thyroiditis on 10/2020 CT scan but no TFTs were done 2/2021. \par \par Bulgarian interepter Glenn [id number]\par \par History of tonsil cancer, treatment is complete\par \par C.O "every morning when I wake up, throat is dry"\par Denies weight loss, palpitations, tremors \par \par Is no longer using PEG tube and now oral food/water consumption\par \par Current TFTs\par 2/2021 TSH 0.07, Total T3 401, Serum T4 24.9\par \par Current regimen\par On no medication for thyroid\par \par On CT scan\par 10/2020 Thyroid gland enlarged and heterogenous enhancing with some left lobe calcifications, likely representing goiter. \par No thyroid ultrasound

## 2021-03-23 ENCOUNTER — NON-APPOINTMENT (OUTPATIENT)
Age: 56
End: 2021-03-23

## 2021-03-23 ENCOUNTER — OUTPATIENT (OUTPATIENT)
Dept: OUTPATIENT SERVICES | Facility: HOSPITAL | Age: 56
LOS: 1 days | Discharge: HOME | End: 2021-03-23

## 2021-03-23 DIAGNOSIS — C76.0 MALIGNANT NEOPLASM OF HEAD, FACE AND NECK: ICD-10-CM

## 2021-03-23 DIAGNOSIS — Z98.890 OTHER SPECIFIED POSTPROCEDURAL STATES: Chronic | ICD-10-CM

## 2021-03-23 DIAGNOSIS — Z86.39 PERSONAL HISTORY OF OTHER ENDOCRINE, NUTRITIONAL AND METABOLIC DISEASE: ICD-10-CM

## 2021-03-23 DIAGNOSIS — C09.9 MALIGNANT NEOPLASM OF TONSIL, UNSPECIFIED: ICD-10-CM

## 2021-03-25 LAB
T3 SERPL-MCNC: 401 NG/DL
T4 SERPL-MCNC: 24.9 UG/DL
TSH SERPL-ACNC: 0.07 UIU/ML

## 2021-03-26 ENCOUNTER — RESULT REVIEW (OUTPATIENT)
Age: 56
End: 2021-03-26

## 2021-03-26 ENCOUNTER — APPOINTMENT (OUTPATIENT)
Dept: HEMATOLOGY ONCOLOGY | Facility: CLINIC | Age: 56
End: 2021-03-26
Payer: COMMERCIAL

## 2021-03-26 VITALS
SYSTOLIC BLOOD PRESSURE: 127 MMHG | HEART RATE: 76 BPM | BODY MASS INDEX: 22.53 KG/M2 | HEIGHT: 64 IN | WEIGHT: 132 LBS | DIASTOLIC BLOOD PRESSURE: 88 MMHG | OXYGEN SATURATION: 96 %

## 2021-03-26 DIAGNOSIS — H92.09 OTALGIA, UNSPECIFIED EAR: ICD-10-CM

## 2021-03-26 LAB
BASOPHILS # BLD AUTO: 0.1 K/UL — SIGNIFICANT CHANGE UP (ref 0–0.2)
BASOPHILS NFR BLD AUTO: 1.2 % — SIGNIFICANT CHANGE UP (ref 0–2)
EOSINOPHIL # BLD AUTO: 0.1 K/UL — SIGNIFICANT CHANGE UP (ref 0–0.5)
EOSINOPHIL NFR BLD AUTO: 2.6 % — SIGNIFICANT CHANGE UP (ref 0–6)
HCT VFR BLD CALC: 38.9 % — LOW (ref 39–50)
HGB BLD-MCNC: 12.2 G/DL — LOW (ref 13–17)
LYMPHOCYTES # BLD AUTO: 1 K/UL — SIGNIFICANT CHANGE UP (ref 1–3.3)
LYMPHOCYTES # BLD AUTO: 17.7 % — SIGNIFICANT CHANGE UP (ref 13–44)
MCHC RBC-ENTMCNC: 29.9 PG — SIGNIFICANT CHANGE UP (ref 27–34)
MCHC RBC-ENTMCNC: 31.4 G/DL — LOW (ref 32–36)
MCV RBC AUTO: 95 FL — SIGNIFICANT CHANGE UP (ref 80–100)
MONOCYTES # BLD AUTO: 0.4 K/UL — SIGNIFICANT CHANGE UP (ref 0–0.9)
MONOCYTES NFR BLD AUTO: 7.2 % — SIGNIFICANT CHANGE UP (ref 2–14)
NEUTROPHILS # BLD AUTO: 4.1 K/UL — SIGNIFICANT CHANGE UP (ref 1.8–7.4)
NEUTROPHILS NFR BLD AUTO: 71.3 % — SIGNIFICANT CHANGE UP (ref 43–77)
PLATELET # BLD AUTO: 180 K/UL — SIGNIFICANT CHANGE UP (ref 150–400)
RBC # BLD: 4.1 M/UL — LOW (ref 4.2–5.8)
RBC # FLD: 12 % — SIGNIFICANT CHANGE UP (ref 10.3–14.5)
WBC # BLD: 5.7 K/UL — SIGNIFICANT CHANGE UP (ref 3.8–10.5)
WBC # FLD AUTO: 5.7 K/UL — SIGNIFICANT CHANGE UP (ref 3.8–10.5)

## 2021-03-26 PROCEDURE — 99072 ADDL SUPL MATRL&STAF TM PHE: CPT

## 2021-03-26 PROCEDURE — 99214 OFFICE O/P EST MOD 30 MIN: CPT

## 2021-03-26 NOTE — PHYSICAL EXAM
[Restricted in physically strenuous activity but ambulatory and able to carry out work of a light or sedentary nature] : Status 1- Restricted in physically strenuous activity but ambulatory and able to carry out work of a light or sedentary nature, e.g., light house work, office work [Normal] : affect appropriate [de-identified] :  Significant trismus with interincisor opening of approx. 1.5 cm. Large R. tonsil mass involving the entire right timi soft palate and essentially completely obstructing the R. half of the OPx with involvement of the BOT. \par Significant trismus with interincisor opening of approx. 1.5 cm. Large R. tonsil mass involving the entire right timi soft palate and essentially completely obstructing the R. half of the OPx with involvement of the BOT

## 2021-03-26 NOTE — HISTORY OF PRESENT ILLNESS
[T: ___] : T[unfilled] [N: ___] : N[unfilled] [M: ___] : M[unfilled] [AJCC Stage: ____] : AJCC Stage: [unfilled] [de-identified] : The patient was diagnosed with SCCA  of the right tonsil in October 2020 at the age of 55.  He presented to ENT, Dr. Freddie Navarrete, c/o worsening sore throat, dysphagia and right ear pain.  Physical exam showed a firm mass encompassing the right tonsil.  CT neck showed a large heterogeneously enhancing mass centered in the right palatine tonsil with invasion of the right medial pterygoid muscle and mass effect on and concern for invasion of right base of tongue. This mass measures approximately 4.2 x 4.5 x 4.6 cm. There is associated effacement of the right parapharyngeal fat, right vallecula, and right piriform sinus. The oropharyngeal airway is mildly narrowed and deviated to the left.  There are mildly prominent although subcentimeter right level II lymph nodes measuring up to 0.8 cm in short axis diameter and level IIa and 0.6 mm in short axis diameter level in level IIb. Biopsy with Dr. Navarrete c/w squamous cell carcinoma, non-keratinizing, consistent with HPV-associated squamous cell carcinoma. The outside immunostains show p16, pancytokerain AE1/AE3, Cam5.2, and p63 are positive. \par He next saw Dr. Juan Baez where a laryngoscopy showed a right tonsil mass involving the soft palate, the right lateral pharyngeal wall, BOT. This completely obstructs the OPx on the right but the left side is patent. There doesn't appear to be any involvement of the epiglottis and the larynx. VC are mobile, airway patent.  Staging PET showed an FDG avid mass centered in the right palatine tonsil with invasion of the right medial pterygoid muscle and probable direct invasion of the right base of tongue (SUV 16.1). Mass approaches but does not extend across midline. Effacement of the right parapharyngeal fat, right vallecula, and right piriform sinus. Deviation of the oropharyngeal airway towards the left with mild associated narrowing, unchanged since October 7, 2020.  FDG avid focus right posterior nasal cavity (SUV 6.5) and in the region of the torus tubarius (SUV 4.9).  FDG avid right level 2 cervical lymph nodes:  Right level IIa, 1.0 cm, (SUV 3.4).  Right level IIb, 0.7 cm (SUV 3.2).  FDG avid left level IIa lymph node 1.0 cm (SUV 3.2). Diffuse enlargement of the thyroid gland with uniform FDG avidity (SUV 6.8).  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  [de-identified] : Patient presents s/p C6 CRT with weekly Cisplatin for SCCA  of the right tonsil, completed RT on 1/15/21.  + Odynophagia remains mild and usually QAM only. + Lymphedema around neck, slowly improving.  + Xerostomia, usually QAM.  + Intermittent dysphagia improving.  S/p SLP 2x/week, still eating mostly soft foods like soups but SLP told him to advance diet to whatever he wants.  Excessive thick oral secretions, nearly resolved.  Weight stable. No otalgia, no tinnitus. No erythema around neck. Nausea nearly resolved. No fevers, no cough, no SOB.  \par \par Seen with  # [id number]

## 2021-03-30 LAB
ALBUMIN SERPL ELPH-MCNC: 4.3 G/DL
ALP BLD-CCNC: 45 U/L
ALT SERPL-CCNC: 18 U/L
ANION GAP SERPL CALC-SCNC: 7 MMOL/L
AST SERPL-CCNC: 25 U/L
BILIRUB SERPL-MCNC: 0.6 MG/DL
BUN SERPL-MCNC: 8 MG/DL
CALCIUM SERPL-MCNC: 9.2 MG/DL
CHLORIDE SERPL-SCNC: 102 MMOL/L
CO2 SERPL-SCNC: 31 MMOL/L
CREAT SERPL-MCNC: 0.75 MG/DL
GLUCOSE SERPL-MCNC: 90 MG/DL
MAGNESIUM SERPL-MCNC: 2 MG/DL
POTASSIUM SERPL-SCNC: 4.4 MMOL/L
PROT SERPL-MCNC: 6.6 G/DL
SODIUM SERPL-SCNC: 140 MMOL/L

## 2021-04-19 ENCOUNTER — APPOINTMENT (OUTPATIENT)
Dept: CT IMAGING | Facility: CLINIC | Age: 56
End: 2021-04-19
Payer: COMMERCIAL

## 2021-04-19 ENCOUNTER — APPOINTMENT (OUTPATIENT)
Dept: NUCLEAR MEDICINE | Facility: CLINIC | Age: 56
End: 2021-04-19
Payer: COMMERCIAL

## 2021-04-19 ENCOUNTER — OUTPATIENT (OUTPATIENT)
Dept: OUTPATIENT SERVICES | Facility: HOSPITAL | Age: 56
LOS: 1 days | End: 2021-04-19

## 2021-04-19 DIAGNOSIS — Z98.890 OTHER SPECIFIED POSTPROCEDURAL STATES: Chronic | ICD-10-CM

## 2021-04-19 DIAGNOSIS — C09.9 MALIGNANT NEOPLASM OF TONSIL, UNSPECIFIED: ICD-10-CM

## 2021-04-19 PROCEDURE — 78815 PET IMAGE W/CT SKULL-THIGH: CPT | Mod: 26,PS

## 2021-04-19 PROCEDURE — 70491 CT SOFT TISSUE NECK W/DYE: CPT | Mod: 26

## 2021-04-20 ENCOUNTER — APPOINTMENT (OUTPATIENT)
Dept: ULTRASOUND IMAGING | Facility: CLINIC | Age: 56
End: 2021-04-20
Payer: COMMERCIAL

## 2021-04-20 ENCOUNTER — APPOINTMENT (OUTPATIENT)
Dept: CT IMAGING | Facility: CLINIC | Age: 56
End: 2021-04-20

## 2021-04-20 ENCOUNTER — OUTPATIENT (OUTPATIENT)
Dept: OUTPATIENT SERVICES | Facility: HOSPITAL | Age: 56
LOS: 1 days | End: 2021-04-20

## 2021-04-20 DIAGNOSIS — Z98.890 OTHER SPECIFIED POSTPROCEDURAL STATES: Chronic | ICD-10-CM

## 2021-04-20 DIAGNOSIS — C09.9 MALIGNANT NEOPLASM OF TONSIL, UNSPECIFIED: ICD-10-CM

## 2021-04-20 PROCEDURE — 76536 US EXAM OF HEAD AND NECK: CPT | Mod: 26

## 2021-04-22 ENCOUNTER — OUTPATIENT (OUTPATIENT)
Dept: OUTPATIENT SERVICES | Facility: HOSPITAL | Age: 56
LOS: 1 days | Discharge: HOME | End: 2021-04-22

## 2021-04-22 DIAGNOSIS — C09.9 MALIGNANT NEOPLASM OF TONSIL, UNSPECIFIED: ICD-10-CM

## 2021-04-22 DIAGNOSIS — Z98.890 OTHER SPECIFIED POSTPROCEDURAL STATES: Chronic | ICD-10-CM

## 2021-04-26 ENCOUNTER — APPOINTMENT (OUTPATIENT)
Dept: HEMATOLOGY ONCOLOGY | Facility: CLINIC | Age: 56
End: 2021-04-26

## 2021-05-03 ENCOUNTER — LABORATORY RESULT (OUTPATIENT)
Age: 56
End: 2021-05-03

## 2021-05-03 ENCOUNTER — APPOINTMENT (OUTPATIENT)
Dept: ENDOCRINOLOGY | Facility: CLINIC | Age: 56
End: 2021-05-03
Payer: COMMERCIAL

## 2021-05-03 VITALS
DIASTOLIC BLOOD PRESSURE: 80 MMHG | BODY MASS INDEX: 22.71 KG/M2 | SYSTOLIC BLOOD PRESSURE: 120 MMHG | WEIGHT: 133 LBS | HEIGHT: 64 IN

## 2021-05-03 PROCEDURE — 99072 ADDL SUPL MATRL&STAF TM PHE: CPT

## 2021-05-03 PROCEDURE — 99213 OFFICE O/P EST LOW 20 MIN: CPT

## 2021-05-03 NOTE — ASSESSMENT
[FreeTextEntry1] : Hyperthyroidism\par -Repeat TFTs , will monitor if LT4 50 mcg dose is appropriate , will call pt with results\par \par MNG\par -Will repeat thyroid sonogram in 6 months- due to nodule size, will not biopsy at this time (advice obtained from attending physican) \par \par RTO 3 months

## 2021-05-03 NOTE — HISTORY OF PRESENT ILLNESS
[FreeTextEntry1] : Patient presents with new onset hyperthyroid, likely 2/2 cancer treatment although appears thyroid was showing signs of thyroiditis on 10/2020 CT scan. Now hypothyroid with positive thyroid antibodies meaning an autoimmune response, this is likely coming after neck radiation.\par \par Botswanan interepter Meet [id number]\par \par History of tonsil cancer, treatment is complete\par \par C.O "every morning when I wake up, throat is dry"- improving\par Denies weight loss, palpitations, tremors \par \par Is no longer using PEG tube and now oral food/water consumption\par \par Current TFTs\par 3/2021 TSH 11.80, Free T3 4.25 , Free T4 1.1\par \par Current regimen\par Levothyroxine 50 mcg daily\par Patient advised to take every day in the morning, on an empty stomach, and with no other medications. \par \par On CT scan\par 10/2020 Thyroid gland enlarged and heterogenous enhancing with some left lobe calcifications, likely representing goiter. \par \par 4/2021\par Thyroid sonogram \par Findings most compatible thyroiditis. \par There is nodule of the lower pole suspicious for malignancy. In light of its small size  consider follow up evaluation in 6 months time \par left lobe, mid to lower pole- 0.9 x 0.7 x 0.8 cm- irregularly marginated solid nodule centrally of microcalcifications - TI-RAD 4

## 2021-05-03 NOTE — PHYSICAL EXAM
[Alert] : alert [Well Nourished] : well nourished [No Acute Distress] : no acute distress [Normal Sclera/Conjunctiva] : normal sclera/conjunctiva [Normal Hearing] : hearing was normal [No Accessory Muscle Use] : no accessory muscle use [Clear to Auscultation] : lungs were clear to auscultation bilaterally [Normal S1, S2] : normal S1 and S2 [Normal Rate] : heart rate was normal [No Edema] : no peripheral edema [Not Tender] : non-tender [Soft] : abdomen soft [Normal Gait] : normal gait [No Rash] : no rash [No Tremors] : no tremors [Oriented x3] : oriented to person, place, and time [de-identified] : enlarged neck- unable to differentiate between swollen from thyroid vs tonsils

## 2021-05-03 NOTE — REVIEW OF SYSTEMS
[Dysphagia] : dysphagia [Neck Pain] : neck pain [Fatigue] : no fatigue [Recent Weight Gain (___ Lbs)] : no recent weight gain [Recent Weight Loss (___ Lbs)] : no recent weight loss [Chest Pain] : no chest pain [Shortness Of Breath] : no shortness of breath [Constipation] : no constipation [Diarrhea] : no diarrhea [Polyuria] : no polyuria [Polydipsia] : no polydipsia [FreeTextEntry4] : dry mouth

## 2021-05-04 ENCOUNTER — NON-APPOINTMENT (OUTPATIENT)
Age: 56
End: 2021-05-04

## 2021-05-10 ENCOUNTER — APPOINTMENT (OUTPATIENT)
Dept: OTOLARYNGOLOGY | Facility: CLINIC | Age: 56
End: 2021-05-10
Payer: COMMERCIAL

## 2021-05-10 VITALS
SYSTOLIC BLOOD PRESSURE: 149 MMHG | BODY MASS INDEX: 22.53 KG/M2 | TEMPERATURE: 96.7 F | DIASTOLIC BLOOD PRESSURE: 81 MMHG | WEIGHT: 132 LBS | HEIGHT: 64 IN | OXYGEN SATURATION: 97 % | HEART RATE: 71 BPM

## 2021-05-10 DIAGNOSIS — L92.9 GRANULOMATOUS DISORDER OF THE SKIN AND SUBCUTANEOUS TISSUE, UNSPECIFIED: ICD-10-CM

## 2021-05-10 PROCEDURE — 31575 DIAGNOSTIC LARYNGOSCOPY: CPT

## 2021-05-10 PROCEDURE — 17250 CHEM CAUT OF GRANLTJ TISSUE: CPT

## 2021-05-10 PROCEDURE — 99072 ADDL SUPL MATRL&STAF TM PHE: CPT

## 2021-05-10 PROCEDURE — 99214 OFFICE O/P EST MOD 30 MIN: CPT | Mod: 25

## 2021-05-10 NOTE — REASON FOR VISIT
[Subsequent Evaluation] : a subsequent evaluation for [[employer] ] : provided by [employer]   [FreeTextEntry1] : [id number] [FreeTextEntry2] : zenobia [TWNoteComboBox1] : Ghanaian

## 2021-05-10 NOTE — PHYSICAL EXAM
[Laryngoscopy Performed] : laryngoscopy was performed, see procedure section for findings [Normal] : no rashes [de-identified] : Posttreatment changes, no LAD.   [FreeTextEntry1] : Trismus is improved with interincisor opening of approx. 2 cm.  Significant response to treatment with resolution of previously seen R. tonsillar mass.  Mucositis is resolved.

## 2021-05-10 NOTE — HISTORY OF PRESENT ILLNESS
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 Pt finished tx.  last ct of neck and pet ct in 4/2021, [place]. pt is being f/u with Dr. Young for his thyroid problem. Pt able to tolerate PO and eating regular diet. pt is not using PEG tube for since FEB 2021 .  He is no longer need  SLP and trimus is resolved.   He denies any dyspnea, otalgia.  Gaining wt.

## 2021-05-10 NOTE — PROCEDURE
[Trismus] : trismus preventing mirror examination [None] : none [Flexible Endoscope] : examined with the flexible endoscope [FreeTextEntry1] : Removal of PEG and cauterization of granulation tissue [FreeTextEntry2] : PEG no longer needed, granulation tissue at PEG site [FreeTextEntry3] : After patient gave consent, the PEG was removed.  There was exuberant granulation at the site which was cauterized with silver nitrate.  A dressing was applied.  Patient tolerated the procedure well.   [Serial Number: ___] : Serial Number: [unfilled] [de-identified] : No lesions in the NPx, OPx, HPx or larynx.  Stable posttreatment changes, VC are mobile, airway patent.\par  [de-identified] : Tonsil SCCa

## 2021-05-18 ENCOUNTER — APPOINTMENT (OUTPATIENT)
Dept: OTOLARYNGOLOGY | Facility: CLINIC | Age: 56
End: 2021-05-18
Payer: COMMERCIAL

## 2021-05-18 PROCEDURE — XXXXX: CPT

## 2021-07-01 ENCOUNTER — LABORATORY RESULT (OUTPATIENT)
Age: 56
End: 2021-07-01

## 2021-07-22 ENCOUNTER — APPOINTMENT (OUTPATIENT)
Dept: RADIATION ONCOLOGY | Facility: CLINIC | Age: 56
End: 2021-07-22
Payer: COMMERCIAL

## 2021-07-22 VITALS
DIASTOLIC BLOOD PRESSURE: 97 MMHG | WEIGHT: 132.2 LBS | SYSTOLIC BLOOD PRESSURE: 139 MMHG | HEART RATE: 79 BPM | OXYGEN SATURATION: 98 % | BODY MASS INDEX: 22.69 KG/M2 | RESPIRATION RATE: 16 BRPM

## 2021-07-22 PROCEDURE — 99212 OFFICE O/P EST SF 10 MIN: CPT

## 2021-07-22 PROCEDURE — 99072 ADDL SUPL MATRL&STAF TM PHE: CPT

## 2021-07-23 NOTE — ASSESSMENT
[No evidence of disease] : No evidence of disease [FreeTextEntry1] : modest degree of treatment related sequelae resolving. Being managed for benign hypothyroidism.

## 2021-07-23 NOTE — PHYSICAL EXAM
[Examination Of The Oral Cavity] : the lips and gums were normal [Normal Oral Cavity] : oral cavity was normal [Feeding Tube] : a feeding tube was present [Outer Ear] : the ears and nose were normal in appearance [Hearing Threshold Finger Rub Not Meade] : hearing was normal [Both Tympanic Membranes Were Examined] : both tympanic membranes were normal [Nasal Cavity] : the nasal mucosa and septum were normal [Normal] : normal skin color and pigmentation and no rash [de-identified] : skin dryness left neck

## 2021-07-23 NOTE — LETTER GREETING
[Dear Doctor] : Dear Doctor, [Follow-Up] : Your patient, [unfilled] was seen in my office today for follow-up [Please see my note below.] : Please see my note below. [FreeTextEntry2] : Juan Baez MD\par Kaia Cain MD

## 2021-07-23 NOTE — REASON FOR VISIT
[Routine Follow-Up] : routine follow-up visit for [Head and Neck Cancer] : head and neck cancer [Other: _____] : [unfilled] [FreeTextEntry1] : [id number] [FreeTextEntry2] : [place]

## 2021-07-23 NOTE — LETTER CLOSING
[Sincerely yours,] : Sincerely yours, [FreeTextEntry3] : Lonnie Strong MD\par Physician in Chief\par Department of Radiation Medicine\par [facility] [place]\par [facility] [place]\par \par  of Radiation Medicine\par [facility]\par at  [facility]\par \par Radiation \par [facility]/\par [facility] [place]\par [address]\par [place], [place] [address]\par \par Tel: [phone]\par Fax: [phone]\par

## 2021-07-23 NOTE — HISTORY OF PRESENT ILLNESS
[FreeTextEntry1] : This 54 y/o [place] speaking male presents for routine follow-up.  Mr. 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 Completing 6 cycles of weekly Cisplatin and 7000 cGy radiation therapy on 1/15/2021\par \par Mr. Rubio notes trismus continues to improve.  Denies otalgia, tinnitus\par Reports eating and drinking most foods without difficulty.  Reports continued dysgeusia.\par \par Follows with DR. Baez and Dr. Cain.\par \par 4/19/2021 PET/CT\par IMPRESSION:\par Since PET/CT October 21, 2020:\par 1.  Resolved FDG avid right tonsillar mass.\par 2.   Right level IIb lymph nodes decreased in size with resolved FDG avidity. Minimally FDG avid right level IIa/Ib and left level IIa lymph nodes are slightly decreased in size and FDG avidity or unchanged, nonspecific, possibly reactive.\par 3.  Resolved FDG avid focus in the right prostate.\par 4.  Unchanged diffuse goiter with heterogeneous FDG avidity, possibly thyroiditis, and peripherally calcified nodule left lobe. Suggest correlation with thyroid function tests, and possibly thyroid ultrasound and I-123 thyroid uptake and scan as indicated.\par  \par Interim medical history includes management of benign thyroid disease resulting in hypothyroidism.

## 2021-07-23 NOTE — VITALS
[Least Pain Intensity: 0/10] : 0/10 [NoTreatment Scheduled] : no treatment scheduled [ECOG Performance Status: 1 - Restricted in physically strenuous activity but ambulatory and able to carry out work of a light or sedentary nature] : Performance Status: 1 - Restricted in physically strenuous activity but ambulatory and able to carry out work of a light or sedentary nature, e.g., light house work, office work [Maximal Pain Intensity: 0/10] : 0/10 [80: Normal activity with effort; some signs or symptoms of disease.] : 80: Normal activity with effort; some signs or symptoms of disease.

## 2021-07-23 NOTE — REVIEW OF SYSTEMS
[Negative] : Allergic/Immunologic [Dysphagia] : no dysphagia [Loss of Hearing] : no loss of hearing [Nosebleeds] : no nosebleeds [Hoarseness] : no hoarseness [Mucosal Pain] : no mucosal pain [FreeTextEntry4] : some persistent taste alterations. [de-identified] : s/p chemotherapy

## 2021-07-23 NOTE — DISEASE MANAGEMENT
[Clinical] : TNM Stage: c [III] : III [FreeTextEntry4] : squamous cell carcinoma [TTNM] : 4 [NTNM] : 2 [MTNM] : 0 [de-identified] : 7000 cGy [de-identified] : tonsillar fossa

## 2021-08-12 ENCOUNTER — APPOINTMENT (OUTPATIENT)
Dept: ENDOCRINOLOGY | Facility: CLINIC | Age: 56
End: 2021-08-12
Payer: COMMERCIAL

## 2021-08-12 VITALS
HEART RATE: 76 BPM | SYSTOLIC BLOOD PRESSURE: 138 MMHG | OXYGEN SATURATION: 99 % | BODY MASS INDEX: 22.53 KG/M2 | DIASTOLIC BLOOD PRESSURE: 88 MMHG | HEIGHT: 64 IN | WEIGHT: 132 LBS

## 2021-08-12 PROCEDURE — 99214 OFFICE O/P EST MOD 30 MIN: CPT | Mod: 25

## 2021-08-12 RX ORDER — OXYCODONE AND ACETAMINOPHEN 7.5; 325 MG/1; MG/1
7.5-325 TABLET ORAL
Qty: 80 | Refills: 0 | Status: DISCONTINUED | COMMUNITY
Start: 2020-10-26 | End: 2021-08-12

## 2021-08-12 RX ORDER — ONDANSETRON 8 MG/1
8 TABLET, ORALLY DISINTEGRATING ORAL EVERY 8 HOURS
Qty: 90 | Refills: 3 | Status: DISCONTINUED | COMMUNITY
Start: 2020-10-30 | End: 2021-08-12

## 2021-08-12 RX ORDER — ATENOLOL 25 MG/1
25 TABLET ORAL
Refills: 0 | Status: DISCONTINUED | COMMUNITY
End: 2021-08-12

## 2021-08-12 RX ORDER — LORAZEPAM 0.5 MG/1
0.5 TABLET ORAL
Qty: 28 | Refills: 0 | Status: DISCONTINUED | COMMUNITY
Start: 2020-12-28 | End: 2021-08-12

## 2021-08-12 RX ORDER — NYSTATIN 100000 [USP'U]/ML
100000 SUSPENSION ORAL 4 TIMES DAILY
Qty: 200 | Refills: 1 | Status: DISCONTINUED | COMMUNITY
Start: 2020-12-21 | End: 2021-08-12

## 2021-08-12 RX ORDER — DIPHENHYDRAMINE HYDROCHLORIDE AND LIDOCAINE HYDROCHLORIDE AND ALUMINUM HYDROXIDE AND MAGNESIUM HYDRO
KIT
Qty: 1 | Refills: 3 | Status: DISCONTINUED | COMMUNITY
Start: 2020-10-30 | End: 2021-08-12

## 2021-08-12 NOTE — HISTORY OF PRESENT ILLNESS
[FreeTextEntry1] : Follow-up hypothyroid.\par Initially presented with new onset hyperthyroidism, noted during treatment for tonsil cancer (received chemotherapy- cisplatin, and radition, completed 1/15/2021).\par Converted to hypothyroidism with +TPO antibodies March 2021, secondary to radiation? Possible thyroiditis?\par \par c.o dry throat, improving.\par Denies weight loss, palpitations, tremors \par \par No longer using PEG tube, eating and drinking more.\par \par Current regimen\par Levothyroxine 75 mcg daily, takes appropriately\par \par 4/2021\par Thyroid sonogram \par Findings most compatible thyroiditis. \par There is nodule of the lower pole suspicious for malignancy. In light of its small size consider follow up evaluation in 6 months time \par left lobe, mid to lower pole- 0.9 x 0.7 x 0.8 cm- irregularly marginated solid nodule centrally of microcalcifications - TI-RAD 4 \par \par 4/19/2021 PET/CT\par IMPRESSION:\par Since PET/CT October 21, 2020:\par 1. Resolved FDG avid right tonsillar mass.\par 2. Right level IIb lymph nodes decreased in size with resolved FDG avidity. Minimally FDG avid right level IIa/Ib and left level IIa lymph nodes are slightly decreased in size and FDG avidity or unchanged, nonspecific, possibly reactive.\par 3. Resolved FDG avid focus in the right prostate.\par 4. Unchanged diffuse goiter with heterogeneous FDG avidity, possibly thyroiditis, and peripherally calcified nodule left lobe. Suggest correlation with thyroid function tests, and possibly thyroid ultrasound and I-123 thyroid uptake and scan as indicated.\par

## 2021-08-12 NOTE — REASON FOR VISIT
[Follow - Up] : a follow-up visit [Hypothyroidism] : hypothyroidism [[employer] ] : provided by [employer]   [Time Spent: ____ minutes] : Total time spent using  services: [unfilled] minutes. The patient's primary language is not English thus required  services. [FreeTextEntry1] : [id number] [FreeTextEntry2] : Howard [TWNoteComboBox1] : Togolese

## 2021-08-12 NOTE — ASSESSMENT
[FreeTextEntry1] : 55 year old male with hypothyroidism and thyroid nodule. TSH normal with elevated TT3 and free T3.\par \par 1. Hypothyroidism\par -Continue current dose of LT4.\par -Closely monitor TFTs- repeat in 1 month with TSI and TRab.\par \par 2. MNG\par -Repeat sonogram now.

## 2021-08-12 NOTE — PHYSICAL EXAM
[Alert] : alert [Well Nourished] : well nourished [No Acute Distress] : no acute distress [Well Developed] : well developed [Normal Sclera/Conjunctiva] : normal sclera/conjunctiva [EOMI] : extra ocular movement intact [No Proptosis] : no proptosis [Thyroid Not Enlarged] : the thyroid was not enlarged [No Thyroid Nodules] : no palpable thyroid nodules [No Respiratory Distress] : no respiratory distress [No Accessory Muscle Use] : no accessory muscle use [Clear to Auscultation] : lungs were clear to auscultation bilaterally [Normal S1, S2] : normal S1 and S2 [Normal Rate] : heart rate was normal [Regular Rhythm] : with a regular rhythm [No Edema] : no peripheral edema [Normal Reflexes] : deep tendon reflexes were 2+ and symmetric [No Tremors] : no tremors [Oriented x3] : oriented to person, place, and time [Normal Affect] : the affect was normal [Normal Mood] : the mood was normal [Acanthosis Nigricans] : no acanthosis nigricans

## 2021-08-20 ENCOUNTER — APPOINTMENT (OUTPATIENT)
Dept: OTOLARYNGOLOGY | Facility: CLINIC | Age: 56
End: 2021-08-20

## 2021-08-30 ENCOUNTER — APPOINTMENT (OUTPATIENT)
Dept: ULTRASOUND IMAGING | Facility: CLINIC | Age: 56
End: 2021-08-30
Payer: COMMERCIAL

## 2021-08-30 ENCOUNTER — OUTPATIENT (OUTPATIENT)
Dept: OUTPATIENT SERVICES | Facility: HOSPITAL | Age: 56
LOS: 1 days | End: 2021-08-30

## 2021-08-30 DIAGNOSIS — Z98.890 OTHER SPECIFIED POSTPROCEDURAL STATES: Chronic | ICD-10-CM

## 2021-08-30 DIAGNOSIS — E03.9 HYPOTHYROIDISM, UNSPECIFIED: ICD-10-CM

## 2021-08-30 PROCEDURE — 76536 US EXAM OF HEAD AND NECK: CPT | Mod: 26

## 2021-09-09 LAB
T3 SERPL-MCNC: 214 NG/DL
T3FREE SERPL-MCNC: 3.86 PG/ML
T4 FREE SERPL-MCNC: 1.4 NG/DL
T4 SERPL-MCNC: 11 UG/DL
TSH SERPL-ACNC: 2.09 UIU/ML

## 2021-09-10 LAB — TSI ACT/NOR SER: <0.1 IU/L

## 2021-09-14 LAB — TSH RECEPTOR AB: <1.1 IU/L

## 2021-09-21 ENCOUNTER — APPOINTMENT (OUTPATIENT)
Dept: ENDOCRINOLOGY | Facility: CLINIC | Age: 56
End: 2021-09-21
Payer: COMMERCIAL

## 2021-09-21 VITALS
DIASTOLIC BLOOD PRESSURE: 90 MMHG | OXYGEN SATURATION: 99 % | BODY MASS INDEX: 22.66 KG/M2 | SYSTOLIC BLOOD PRESSURE: 128 MMHG | WEIGHT: 136 LBS | HEART RATE: 60 BPM | HEIGHT: 65 IN

## 2021-09-21 PROCEDURE — 99214 OFFICE O/P EST MOD 30 MIN: CPT | Mod: 25

## 2021-09-21 NOTE — ASSESSMENT
[Levothyroxine] : The patient was instructed to take Levothyroxine on an empty stomach, separate from vitamins, and wait at least 30 minutes before eating [FreeTextEntry1] : 55 year old male with hypothyroidism and thyroid nodule. TSH normal with elevated TT3 and free T3.\par \par 1. Hypothyroidism\par -Continue current dose of LT4.\par -Closely monitor TFTs- repeat in 6 weeks \par \par 2. MNG\par Send for FNA of left thyroid nodule \par \par \par RTO in 6 weeks

## 2021-09-21 NOTE — REASON FOR VISIT
[Follow - Up] : a follow-up visit [Hypothyroidism] : hypothyroidism [Thyroid nodule/ MNG] : thyroid nodule/ MNG [[employer] ] : provided by [employer]   [Time Spent: ____ minutes] : Total time spent using  services: [unfilled] minutes. The patient's primary language is not English thus required  services. [Interpreters_IDNumber] : [id number] [Interpreters_FullName] : Louise

## 2021-09-21 NOTE — REVIEW OF SYSTEMS
[Fatigue] : no fatigue [Visual Field Defect] : no visual field defect [Blurred Vision] : no blurred vision [Dysphagia] : no dysphagia [Neck Pain] : no neck pain [Chest Pain] : no chest pain [Palpitations] : no palpitations [Shortness Of Breath] : no shortness of breath [Nausea] : no nausea [Constipation] : no constipation [Vomiting] : no vomiting [Diarrhea] : no diarrhea [Polyuria] : no polyuria [Polydipsia] : no polydipsia

## 2021-09-21 NOTE — HISTORY OF PRESENT ILLNESS
[FreeTextEntry1] : Follow-up hypothyroid.\par Initially presented with new onset hyperthyroidism, noted during treatment for tonsil cancer (received chemotherapy- cisplatin, and radition, completed 1/15/2021).\par Converted to hypothyroidism with +TPO antibodies March 2021, secondary to radiation? Possible thyroiditis?\par \par c.o dry throat, improving.Gained 4 pounds \par Denies weight loss, palpitations, tremors \par \par Eating and drinking more, starting to gain weight \par \par Current regimen\par Levothyroxine 75 mcg daily, takes appropriately\par \par Current TFT's\par TSH 2.09, FT4 1.4\par \par 8/2021\par Thyroid sonogram \par Findings most compatible thyroiditis. \par Left Lobe:, mid pole-1cm hypoechoic anterolaterally with intrinsic macrocalcifications, TR 4, previously 9 mm.\par \par \par 4/19/2021 PET/CT\par IMPRESSION:\par Since PET/CT October 21, 2020:\par 1. Resolved FDG avid right tonsillar mass.\par 2. Right level IIb lymph nodes decreased in size with resolved FDG avidity. Minimally FDG avid right level IIa/Ib and left level IIa lymph nodes are slightly decreased in size and FDG avidity or unchanged, nonspecific, possibly reactive.\par 3. Resolved FDG avid focus in the right prostate.\par 4. Unchanged diffuse goiter with heterogeneous FDG avidity, possibly thyroiditis, and peripherally calcified nodule left lobe. Suggest correlation with thyroid function tests, and possibly thyroid ultrasound and I-123 thyroid uptake and scan as indicated.\par

## 2021-09-21 NOTE — PHYSICAL EXAM
[Alert] : alert [Normal Sclera/Conjunctiva] : normal sclera/conjunctiva [Normal Hearing] : hearing was normal [No Respiratory Distress] : no respiratory distress [No Accessory Muscle Use] : no accessory muscle use [Clear to Auscultation] : lungs were clear to auscultation bilaterally [Normal S1, S2] : normal S1 and S2 [Normal Rate] : heart rate was normal [Normal Gait] : normal gait [No Stigmata of Cushings Syndrome] : no stigmata of Cushings Syndrome [Oriented x3] : oriented to person, place, and time [de-identified] : palpable left thyroid nodule

## 2021-09-27 ENCOUNTER — APPOINTMENT (OUTPATIENT)
Dept: OTOLARYNGOLOGY | Facility: CLINIC | Age: 56
End: 2021-09-27
Payer: COMMERCIAL

## 2021-09-27 VITALS
HEIGHT: 66 IN | BODY MASS INDEX: 22.02 KG/M2 | HEART RATE: 100 BPM | SYSTOLIC BLOOD PRESSURE: 146 MMHG | OXYGEN SATURATION: 98 % | WEIGHT: 137 LBS | DIASTOLIC BLOOD PRESSURE: 91 MMHG | TEMPERATURE: 97 F

## 2021-09-27 DIAGNOSIS — Z00.00 ENCOUNTER FOR GENERAL ADULT MEDICAL EXAMINATION W/OUT ABNORMAL FINDINGS: ICD-10-CM

## 2021-09-27 PROCEDURE — 31575 DIAGNOSTIC LARYNGOSCOPY: CPT

## 2021-09-27 PROCEDURE — 99214 OFFICE O/P EST MOD 30 MIN: CPT | Mod: 25

## 2021-09-27 NOTE — PROCEDURE
[None] : none [Flexible Endoscope] : examined with the flexible endoscope [Serial Number: ___] : Serial Number: [unfilled] [de-identified] : No lesions in the NPx, OPx, HPx or larynx.  Stable posttreatment changes, VC are mobile, airway patent.\par  [de-identified] : Tonsil SCCa

## 2021-09-27 NOTE — HISTORY OF PRESENT ILLNESS
[de-identified] : Pt is here to f/up s/p CXRT for R tonsil SCCa on 1/2021.  last ct of neck and pet ct in 4/2021, [place]. pt is being f/u with Dr. Young for his thyroid problem and will sched thyroid biopsy.  Pt able to tolerate PO and eating regular diet. pt 's PEG tube was pulled on 5/ 2021.  pt is doing well, regular diet and gaining wt.   He denies any dyspnea, otalgia and dysphagia.

## 2021-09-27 NOTE — PHYSICAL EXAM
[Laryngoscopy Performed] : laryngoscopy was performed, see procedure section for findings [Normal] : no rashes [de-identified] : Posttreatment changes, no LAD.   [FreeTextEntry1] : Trismus is improved with interincisor opening of approx. 2.5 cm.  No concerning lesions in the OC/OPx on inspection or palpation.  Posttreatment changes are stable. \par

## 2021-09-27 NOTE — REASON FOR VISIT
[Subsequent Evaluation] : a subsequent evaluation for [[employer] ] : provided by [employer]   [FreeTextEntry2] : f/u tonsil cancer [Interpreters_IDNumber] : [id number] [Interpreters_FullName] : milo [TWNoteComboBox1] : Indian

## 2021-10-20 ENCOUNTER — LABORATORY RESULT (OUTPATIENT)
Age: 56
End: 2021-10-20

## 2021-10-21 ENCOUNTER — APPOINTMENT (OUTPATIENT)
Dept: RADIATION ONCOLOGY | Facility: CLINIC | Age: 56
End: 2021-10-21
Payer: COMMERCIAL

## 2021-10-21 VITALS
RESPIRATION RATE: 16 BRPM | OXYGEN SATURATION: 97 % | WEIGHT: 136.6 LBS | SYSTOLIC BLOOD PRESSURE: 161 MMHG | BODY MASS INDEX: 22.05 KG/M2 | HEART RATE: 75 BPM | DIASTOLIC BLOOD PRESSURE: 100 MMHG

## 2021-10-21 PROCEDURE — 99212 OFFICE O/P EST SF 10 MIN: CPT

## 2021-10-21 NOTE — PHYSICAL EXAM
[Hearing Threshold Finger Rub Not [place]] : hearing was normal [Examination Of The Oral Cavity] : the lips and gums were normal [Nasal Cavity] : the nasal mucosa and septum were normal [Feeding Tube] : a feeding tube was present [Outer Ear] : the ears and nose were normal in appearance [Both Tympanic Membranes Were Examined] : both tympanic membranes were normal [Normal] : oriented to person, place and time, the affect was normal, the mood was normal and not anxious

## 2021-10-22 NOTE — REASON FOR VISIT
[Routine Follow-Up] : routine follow-up visit for [Head and Neck Cancer] : head and neck cancer [Other: _____] : [unfilled] [Interpreters_IDNumber] : [id number] [Interpreters_FullName] : Latrice [FreeTextEntry3] : Slovenian

## 2021-10-22 NOTE — ASSESSMENT
[No evidence of disease] : No evidence of disease [FreeTextEntry1] : modest degree of treatment related sequelae resolving. Being managed for benign hypothyroidism. Has a pending thyroid nodule bx.

## 2021-10-22 NOTE — REVIEW OF SYSTEMS
[Dysphagia] : no dysphagia [Loss of Hearing] : no loss of hearing [Nosebleeds] : no nosebleeds [Hoarseness] : no hoarseness [Mucosal Pain] : no mucosal pain [Negative] : Heme/Lymph [FreeTextEntry4] : some persistent taste alterations, and dry mouth [de-identified] : s/p chemotherapy

## 2021-10-22 NOTE — LETTER CLOSING
[Sincerely yours,] : Sincerely yours, [FreeTextEntry3] : Lonnie Strong MD\par Physician in Chief\par Department of Radiation Medicine\par [facility] [place]\par [facility] [place]\par \par  of Radiation Medicine\par [facility]\par at  [facility]\par \par Radiation \par [facility]/\par [facility] [place]\par [address]\par [place], [place] [address]\par \par Tel: [phone]\par Fax: [phone]\par

## 2021-10-22 NOTE — HISTORY OF PRESENT ILLNESS
[FreeTextEntry1] : This 57 y/o Mozambican speaking male presents for routine follow-up.  Mr. 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 Completing 6 cycles of weekly Cisplatin and 7000 cGy radiation therapy on 1/15/2021.\par \par Mr. Rubio notes trismus continues to improve.  Denies otalgia, tinnitus.\par Reports eating and drinking most foods without difficulty, however, intermittent dysphagia continues.  He also reports intermittent mouth "peeling".  Advised to begin rinsing with Biotene for dry mouth several times daily.\par \par Follows with DR. Baez and Dr. Cain.\par \par 4/19/2021 PET/CT\par IMPRESSION:\par Since PET/CT October 21, 2020:\par 1.  Resolved FDG avid right tonsillar mass.\par 2.   Right level IIb lymph nodes decreased in size with resolved FDG avidity. Minimally FDG avid right level IIa/Ib and left level IIa lymph nodes are slightly decreased in size and FDG avidity or unchanged, nonspecific, possibly reactive.\par 3.  Resolved FDG avid focus in the right prostate.\par 4.  Unchanged diffuse goiter with heterogeneous FDG avidity, possibly thyroiditis, and peripherally calcified nodule left lobe. Suggest correlation with thyroid function tests, and possibly thyroid ultrasound and I-123 thyroid uptake and scan as indicated.\par  \par Interim medical history includes management of benign thyroid disease resulting in hypothyroidism.

## 2021-10-22 NOTE — DISEASE MANAGEMENT
[Clinical] : TNM Stage: c [III] : III [FreeTextEntry4] : squamous cell carcinoma [TTNM] : 4 [NTNM] : 2 [MTNM] : 0 [de-identified] : 7000 cGy [de-identified] : tonsillar fossa

## 2021-10-22 NOTE — VITALS
[80: Normal activity with effort; some signs or symptoms of disease.] : 80: Normal activity with effort; some signs or symptoms of disease.  [ECOG Performance Status: 1 - Restricted in physically strenuous activity but ambulatory and able to carry out work of a light or sedentary nature] : Performance Status: 1 - Restricted in physically strenuous activity but ambulatory and able to carry out work of a light or sedentary nature, e.g., light house work, office work [Least Pain Intensity: 0/10] : 0/10 [NoTreatment Scheduled] : no treatment scheduled [Maximal Pain Intensity: 2/10] : 2/10 [Pain Location: ___] : Pain Location: [unfilled]

## 2021-11-01 ENCOUNTER — APPOINTMENT (OUTPATIENT)
Dept: ENDOCRINOLOGY | Facility: CLINIC | Age: 56
End: 2021-11-01
Payer: COMMERCIAL

## 2021-11-01 VITALS
WEIGHT: 139 LBS | DIASTOLIC BLOOD PRESSURE: 100 MMHG | HEIGHT: 66 IN | SYSTOLIC BLOOD PRESSURE: 148 MMHG | BODY MASS INDEX: 22.34 KG/M2

## 2021-11-01 PROCEDURE — 99214 OFFICE O/P EST MOD 30 MIN: CPT

## 2021-11-01 NOTE — ASSESSMENT
[FreeTextEntry1] : 55 year old male with hypothyroidism and thyroid nodule. \par \par 1. Hypothyroidism\par -Increase LT4 88 mcg \par -Closely monitor TFTs- repeat in 6 weeks \par \par 2. MNG\par Send for FNA of left 1 cm nodule \par \par RTO in 6 weeks NP\par RTO 4 months Dr. Tam  [Levothyroxine] : The patient was instructed to take Levothyroxine on an empty stomach, separate from vitamins, and wait at least 30 minutes before eating

## 2021-11-01 NOTE — HISTORY OF PRESENT ILLNESS
[FreeTextEntry1] : Follow-up hypothyroid.\par Initially presented with new onset hyperthyroidism, noted during treatment for tonsil cancer (received chemotherapy- cisplatin, and radition, completed 1/15/2021).\par Converted to hypothyroidism with +TPO antibodies March 2021, secondary to radiation? Possible thyroiditis?\par \par c.o dry throat, improving, Weight has been increasing\par Denies weight loss, palpitations, tremors \par \par Eating and drinking more, starting to gain weight \par \par Current regimen\par Levothyroxine 75 mcg daily, takes appropriately\par \par Current TFT's\par TSH 4.3, FT4 1.3, FT3 3.54\par  \par 8/2021\par Thyroid sonogram \par Findings most compatible thyroiditis. \par Left Lobe:, mid pole-1cm hypoechoic anterolaterally with intrinsic macrocalcifications, TR 4, previously 9 mm.\par Did not go for FNA\par \par 4/19/2021 PET/CT\par IMPRESSION:\par Since PET/CT October 21, 2020:\par 1. Resolved FDG avid right tonsillar mass.\par 2. Right level IIb lymph nodes decreased in size with resolved FDG avidity. Minimally FDG avid right level IIa/Ib and left level IIa lymph nodes are slightly decreased in size and FDG avidity or unchanged, nonspecific, possibly reactive.\par 3. Resolved FDG avid focus in the right prostate.\par 4. Unchanged diffuse goiter with heterogeneous FDG avidity, possibly thyroiditis, and peripherally calcified nodule left lobe. Suggest correlation with thyroid function tests, and possibly thyroid ultrasound and I-123 thyroid uptake and scan as indicated.\par

## 2021-11-01 NOTE — REASON FOR VISIT
[Follow - Up] : a follow-up visit [Hypothyroidism] : hypothyroidism [Thyroid nodule/ MNG] : thyroid nodule/ MNG [[employer] ] : provided by [employer]   [Time Spent: ____ minutes] : Total time spent using  services: [unfilled] minutes. The patient's primary language is not English thus required  services. [Interpreters_IDNumber] : [id number] [Interpreters_FullName] : Chucky

## 2021-11-01 NOTE — REVIEW OF SYSTEMS
[Fatigue] : no fatigue [Recent Weight Gain (___ Lbs)] : recent weight gain: [unfilled] lbs [Visual Field Defect] : no visual field defect [Blurred Vision] : no blurred vision [Dysphagia] : no dysphagia [Neck Pain] : no neck pain [Chest Pain] : no chest pain [Palpitations] : no palpitations [Shortness Of Breath] : no shortness of breath [Nausea] : no nausea [Constipation] : no constipation [Vomiting] : no vomiting [Diarrhea] : no diarrhea [Polyuria] : no polyuria [Polydipsia] : no polydipsia

## 2021-11-01 NOTE — PHYSICAL EXAM
[Alert] : alert [Normal Sclera/Conjunctiva] : normal sclera/conjunctiva [Normal Hearing] : hearing was normal [No Respiratory Distress] : no respiratory distress [No Accessory Muscle Use] : no accessory muscle use [Clear to Auscultation] : lungs were clear to auscultation bilaterally [Normal S1, S2] : normal S1 and S2 [Normal Rate] : heart rate was normal [No Stigmata of Cushings Syndrome] : no stigmata of Cushings Syndrome [Normal Gait] : normal gait [Oriented x3] : oriented to person, place, and time [de-identified] : palpable left thyroid nodule

## 2021-12-04 ENCOUNTER — OUTPATIENT (OUTPATIENT)
Dept: OUTPATIENT SERVICES | Facility: HOSPITAL | Age: 56
LOS: 1 days | End: 2021-12-04
Payer: COMMERCIAL

## 2021-12-04 ENCOUNTER — APPOINTMENT (OUTPATIENT)
Dept: CT IMAGING | Facility: CLINIC | Age: 56
End: 2021-12-04

## 2021-12-04 DIAGNOSIS — C09.9 MALIGNANT NEOPLASM OF TONSIL, UNSPECIFIED: ICD-10-CM

## 2021-12-04 DIAGNOSIS — Z98.890 OTHER SPECIFIED POSTPROCEDURAL STATES: Chronic | ICD-10-CM

## 2021-12-04 PROCEDURE — 71260 CT THORAX DX C+: CPT | Mod: 26

## 2021-12-04 PROCEDURE — 70491 CT SOFT TISSUE NECK W/DYE: CPT | Mod: 26

## 2021-12-11 ENCOUNTER — LABORATORY RESULT (OUTPATIENT)
Age: 56
End: 2021-12-11

## 2021-12-22 ENCOUNTER — APPOINTMENT (OUTPATIENT)
Dept: ENDOCRINOLOGY | Facility: CLINIC | Age: 56
End: 2021-12-22
Payer: COMMERCIAL

## 2021-12-22 VITALS
HEIGHT: 66 IN | WEIGHT: 143 LBS | BODY MASS INDEX: 22.98 KG/M2 | SYSTOLIC BLOOD PRESSURE: 158 MMHG | DIASTOLIC BLOOD PRESSURE: 110 MMHG

## 2021-12-22 DIAGNOSIS — E06.3 AUTOIMMUNE THYROIDITIS: ICD-10-CM

## 2021-12-22 PROCEDURE — 99214 OFFICE O/P EST MOD 30 MIN: CPT

## 2021-12-22 NOTE — PHYSICAL EXAM
[Alert] : alert [Well Nourished] : well nourished [No Acute Distress] : no acute distress [Normal Sclera/Conjunctiva] : normal sclera/conjunctiva [Normal Hearing] : hearing was normal [No Accessory Muscle Use] : no accessory muscle use [Clear to Auscultation] : lungs were clear to auscultation bilaterally [Normal S1, S2] : normal S1 and S2 [Normal Rate] : heart rate was normal [No Edema] : no peripheral edema [Not Tender] : non-tender [Soft] : abdomen soft [Normal Gait] : normal gait [No Rash] : no rash [No Tremors] : no tremors [Oriented x3] : oriented to person, place, and time [de-identified] : enlarged neck- unable to differentiate between swollen from thyroid vs tonsils

## 2021-12-22 NOTE — HISTORY OF PRESENT ILLNESS
[FreeTextEntry1] : Patient presents with new onset hyperthyroid, likely 2/2 cancer treatment although appears thyroid was showing signs of thyroiditis on 10/2020 CT scan. Now hypothyroid with positive thyroid antibodies meaning an autoimmune response, this is likely coming after neck radiation.\par \par [facility] interepter Ismael [id number]\par \par History of tonsil cancer, treatment is complete\par \par C.O "every morning when I wake up, throat is dry"- improving\par Denies weight loss, palpitations, tremors \par States he is feeling well today\par \par Is no longer using PEG tube and now oral food/water consumption\par \par Current TFTs\par 12/2021 TSH 2.97, Free T3 4.03, Free T4 1.3\par \par Current regimen\par Levothyroxine 88 mcg daily\par Patient advised to take every day in the morning, on an empty stomach, and with no other medications. \par \par On CT scan\par 10/2020 Thyroid gland enlarged and heterogenous enhancing with some left lobe calcifications, likely representing goiter. \par \par 8/2021\par Thyroid sonogram \par Findings most compatible thyroiditis. \par Left Lobe:, mid pole-1cm hypoechoic anterolaterally with intrinsic macrocalcifications, TR 4, previously 9 mm.\par Did not go for FNA- awaiting Dr. Baez advice \par

## 2021-12-22 NOTE — ASSESSMENT
[FreeTextEntry1] : Hyperthyroidism\par -Repeat TFTs prior to neck apt, continue current dose of LT4 88 mcg daily \par \par MNG\par -Will repeat thyroid sonogram in 6 months due 2/2021\par \par Pt states he is waiting for Dr. Baez to read his CT of neck before he proceeds with FNA\par \par RTO 3 months

## 2021-12-28 ENCOUNTER — APPOINTMENT (OUTPATIENT)
Dept: MRI IMAGING | Facility: CLINIC | Age: 56
End: 2021-12-28
Payer: COMMERCIAL

## 2021-12-28 ENCOUNTER — OUTPATIENT (OUTPATIENT)
Dept: OUTPATIENT SERVICES | Facility: HOSPITAL | Age: 56
LOS: 1 days | End: 2021-12-28

## 2021-12-28 DIAGNOSIS — C09.9 MALIGNANT NEOPLASM OF TONSIL, UNSPECIFIED: ICD-10-CM

## 2021-12-28 DIAGNOSIS — Z98.890 OTHER SPECIFIED POSTPROCEDURAL STATES: Chronic | ICD-10-CM

## 2021-12-28 PROCEDURE — 74183 MRI ABD W/O CNTR FLWD CNTR: CPT | Mod: 26

## 2022-01-10 ENCOUNTER — APPOINTMENT (OUTPATIENT)
Dept: OTOLARYNGOLOGY | Facility: CLINIC | Age: 57
End: 2022-01-10
Payer: COMMERCIAL

## 2022-01-10 VITALS
WEIGHT: 143 LBS | HEART RATE: 85 BPM | SYSTOLIC BLOOD PRESSURE: 167 MMHG | HEIGHT: 66 IN | OXYGEN SATURATION: 95 % | DIASTOLIC BLOOD PRESSURE: 112 MMHG | BODY MASS INDEX: 22.98 KG/M2

## 2022-01-10 PROCEDURE — 99214 OFFICE O/P EST MOD 30 MIN: CPT | Mod: 25

## 2022-01-10 PROCEDURE — 31575 DIAGNOSTIC LARYNGOSCOPY: CPT

## 2022-01-10 NOTE — REASON FOR VISIT
[Subsequent Evaluation] : a subsequent evaluation for [[employer] ] : provided by [employer]   [FreeTextEntry2] : f/u tonsil cancer [Interpreters_IDNumber] : [id number] [Interpreters_FullName] : Eliseo [TWNoteComboBox1] : Citizen of [place]

## 2022-01-10 NOTE — PROCEDURE
[None] : none [Flexible Endoscope] : examined with the flexible endoscope [Serial Number: ___] : Serial Number: [unfilled] [de-identified] : No lesions in the NPx, OPx, HPx or larynx.  Stable posttreatment changes, VC are mobile, airway patent.\par  [de-identified] : Tonsil SCCa

## 2022-01-10 NOTE — HISTORY OF PRESENT ILLNESS
[de-identified] : Pt is here to f/up s/p CXRT for R tonsil SCCa on 1/2021 .  pt is being f/u with Dr. Young for his thyroid problem and no  thyroid biopsy perform due to thyroiditis and now thyroid med.  last ct neck and chest on 12/4/2021 and ct chest showed concerned liver lesion and  12/28/2021 mri of liver 1.8 cm lesion in the right hepatic lobe; differential includes a hemangioma, however is indeterminate; close interval follow-up is recommended with a MRI of the abdomen in 3 months.\par Pt able to tolerate PO and eating regular diet. ppt is doing well, regular diet and gaining wt.   He denies any dyspnea, otalgia and dysphagia.\par \par

## 2022-01-10 NOTE — PHYSICAL EXAM
[Laryngoscopy Performed] : laryngoscopy was performed, see procedure section for findings [Normal] : no rashes [de-identified] : Posttreatment changes, no LAD.   [FreeTextEntry1] : Trismus is improved with interincisor opening of approx. 2.5 cm.  No concerning lesions in the OC/OPx on inspection or palpation.  Posttreatment changes are stable. \par

## 2022-02-02 ENCOUNTER — APPOINTMENT (OUTPATIENT)
Dept: RADIATION ONCOLOGY | Facility: CLINIC | Age: 57
End: 2022-02-02
Payer: COMMERCIAL

## 2022-02-02 VITALS
BODY MASS INDEX: 23.4 KG/M2 | RESPIRATION RATE: 16 BRPM | WEIGHT: 145 LBS | DIASTOLIC BLOOD PRESSURE: 93 MMHG | HEART RATE: 96 BPM | OXYGEN SATURATION: 98 % | SYSTOLIC BLOOD PRESSURE: 151 MMHG

## 2022-02-02 DIAGNOSIS — M89.8X9 OTHER SPECIFIED DISORDERS OF BONE, UNSPECIFIED SITE: ICD-10-CM

## 2022-02-02 PROCEDURE — 99212 OFFICE O/P EST SF 10 MIN: CPT

## 2022-02-03 PROBLEM — M89.8X9 BONE PAIN: Status: ACTIVE | Noted: 2022-02-03

## 2022-02-03 NOTE — LETTER CLOSING
[Sincerely yours,] : Sincerely yours, [FreeTextEntry3] : Lonnie Strong MD\par Physician in Chief\par Department of Radiation Medicine\par [facility] [place]\par [facility] [place]\par \par  of Radiation Medicine\par [facility]\par at  [facility]\par \par Radiation \par [facility]/\par [facility] [place]\par [address]\par [place], [place] [address]\par \par Tel: [phone]\par Fax: [phone]\par

## 2022-02-03 NOTE — REVIEW OF SYSTEMS
[Dysphagia] : no dysphagia [Loss of Hearing] : no loss of hearing [Nosebleeds] : no nosebleeds [Hoarseness] : no hoarseness [Mucosal Pain] : no mucosal pain [Negative] : ENT [FreeTextEntry4] : some persistent taste alterations, and dry mouth [FreeTextEntry9] : notes moderate diffuse bony achiness [de-identified] : s/p chemotherapy

## 2022-02-03 NOTE — HISTORY OF PRESENT ILLNESS
[FreeTextEntry1] : This 55 y/o Tamaz[place] speaking male presents for routine follow-up.  Mr. 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 Completing 6 cycles of weekly Cisplatin and 7000 cGy radiation therapy on 1/15/2021.\par \par Mr. Rubio notes trismus continues to improve.  Denies otalgia, tinnitus.\par Reports eating and drinking most foods without difficulty, however, intermittent dysphagia continues.  Lexus any "mouth peeling"\par Notes achiness in bones of limbs.

## 2022-02-03 NOTE — PHYSICAL EXAM
[Outer Ear] : the ears and nose were normal in appearance [Hearing Threshold Finger Rub Not Alfalfa] : hearing was normal [Examination Of The Oral Cavity] : the lips and gums were normal [Both Tympanic Membranes Were Examined] : both tympanic membranes were normal [Nasal Cavity] : the nasal mucosa and septum were normal [Feeding Tube] : a feeding tube was present [Normal] : normoactive bowel sounds, soft and nontender, no hepatosplenomegaly or masses appreciated [de-identified] : no bone deformity or tenderness.

## 2022-02-03 NOTE — DISEASE MANAGEMENT
[Clinical] : TNM Stage: c [III] : III [FreeTextEntry4] : squamous cell carcinoma [TTNM] : 4 [NTNM] : 2 [MTNM] : 0 [de-identified] : 7000 cGy [de-identified] : tonsillar fossa

## 2022-02-03 NOTE — REASON FOR VISIT
[Routine Follow-Up] : routine follow-up visit for [Head and Neck Cancer] : head and neck cancer [Other: _____] : [unfilled] [Interpreters_IDNumber] : [id number] [Interpreters_FullName] : Latrice [FreeTextEntry3] : Bangladeshi [TWNoteComboBox1] : Togolese

## 2022-02-03 NOTE — ASSESSMENT
[No evidence of disease] : No evidence of disease [FreeTextEntry1] : modest degree of treatment related sequelae resolving. Being managed for benign hypothyroidism. Has a pending thyroid nodule bx. Bone achiness related to chemo effect?

## 2022-02-18 ENCOUNTER — LABORATORY RESULT (OUTPATIENT)
Age: 57
End: 2022-02-18

## 2022-02-22 ENCOUNTER — APPOINTMENT (OUTPATIENT)
Dept: ULTRASOUND IMAGING | Facility: CLINIC | Age: 57
End: 2022-02-22
Payer: COMMERCIAL

## 2022-02-22 ENCOUNTER — OUTPATIENT (OUTPATIENT)
Dept: OUTPATIENT SERVICES | Facility: HOSPITAL | Age: 57
LOS: 1 days | End: 2022-02-22
Payer: COMMERCIAL

## 2022-02-22 DIAGNOSIS — C09.9 MALIGNANT NEOPLASM OF TONSIL, UNSPECIFIED: ICD-10-CM

## 2022-02-22 DIAGNOSIS — Z00.00 ENCOUNTER FOR GENERAL ADULT MEDICAL EXAMINATION WITHOUT ABNORMAL FINDINGS: ICD-10-CM

## 2022-02-22 DIAGNOSIS — E03.9 HYPOTHYROIDISM, UNSPECIFIED: ICD-10-CM

## 2022-02-22 DIAGNOSIS — E06.3 AUTOIMMUNE THYROIDITIS: ICD-10-CM

## 2022-02-22 DIAGNOSIS — E04.1 NONTOXIC SINGLE THYROID NODULE: ICD-10-CM

## 2022-02-22 DIAGNOSIS — Z98.890 OTHER SPECIFIED POSTPROCEDURAL STATES: Chronic | ICD-10-CM

## 2022-02-22 PROCEDURE — 76536 US EXAM OF HEAD AND NECK: CPT | Mod: 26

## 2022-02-22 PROCEDURE — 76536 US EXAM OF HEAD AND NECK: CPT

## 2022-03-02 ENCOUNTER — APPOINTMENT (OUTPATIENT)
Dept: ENDOCRINOLOGY | Facility: CLINIC | Age: 57
End: 2022-03-02
Payer: COMMERCIAL

## 2022-03-02 VITALS
HEIGHT: 66 IN | DIASTOLIC BLOOD PRESSURE: 98 MMHG | SYSTOLIC BLOOD PRESSURE: 152 MMHG | WEIGHT: 148 LBS | BODY MASS INDEX: 23.78 KG/M2

## 2022-03-02 PROCEDURE — 99214 OFFICE O/P EST MOD 30 MIN: CPT

## 2022-03-02 NOTE — PHYSICAL EXAM
[Alert] : alert [Well Nourished] : well nourished [No Acute Distress] : no acute distress [Normal Sclera/Conjunctiva] : normal sclera/conjunctiva [Normal Hearing] : hearing was normal [No Accessory Muscle Use] : no accessory muscle use [Clear to Auscultation] : lungs were clear to auscultation bilaterally [Normal S1, S2] : normal S1 and S2 [Normal Rate] : heart rate was normal [No Edema] : no peripheral edema [Normal Gait] : normal gait [No Rash] : no rash [No Tremors] : no tremors [Oriented x3] : oriented to person, place, and time [de-identified] : enlarged neck- unable to differentiate between swollen from thyroid vs tonsils

## 2022-03-02 NOTE — HISTORY OF PRESENT ILLNESS
[FreeTextEntry1] : Patient presents with new onset hyperthyroid, likely 2/2 cancer treatment although appears thyroid was showing signs of thyroiditis on 10/2020 CT scan. Now hypothyroid with positive thyroid antibodies meaning an autoimmune response, this is likely coming after neck radiation.\par \par  \par \par History of tonsil cancer, treatment is complete\par \par C.O "every morning when I wake up, throat is dry"- improving\par Denies weight loss, palpitations, tremors \par States he is feeling well today\par \par Is no longer using PEG tube and now oral food/water consumption\par \par Current TFTs\par \par Current regimen\par Levothyroxine 88 mcg daily taking correctly \par Patient advised to take every day in the morning, on an empty stomach, and with no other medications. \par \par \par 8/2021\par Thyroid sonogram \par Findings most compatible thyroiditis. \par Left Lobe:, mid pole-0.7 cm hypoechoic anterolaterally with intrinsic macrocalcifications, TR 4, previously 9 mm.\par

## 2022-03-02 NOTE — ASSESSMENT
[FreeTextEntry1] : Hyperthyroidism\par -Repeat TFTs prior to neck apt, continue current dose of LT4 88 mcg daily \par \par MNG\par check sono in  6 months\par  smaller noduel - no FNA for now \par \par HTN see PMD -  was much higher in past \par \par RTO 6  months

## 2022-04-11 ENCOUNTER — APPOINTMENT (OUTPATIENT)
Dept: OTOLARYNGOLOGY | Facility: CLINIC | Age: 57
End: 2022-04-11

## 2022-04-27 ENCOUNTER — RESULT REVIEW (OUTPATIENT)
Age: 57
End: 2022-04-27

## 2022-05-02 ENCOUNTER — OUTPATIENT (OUTPATIENT)
Dept: OUTPATIENT SERVICES | Facility: HOSPITAL | Age: 57
LOS: 1 days | End: 2022-05-02

## 2022-05-02 ENCOUNTER — APPOINTMENT (OUTPATIENT)
Dept: MRI IMAGING | Facility: CLINIC | Age: 57
End: 2022-05-02
Payer: COMMERCIAL

## 2022-05-02 DIAGNOSIS — C09.9 MALIGNANT NEOPLASM OF TONSIL, UNSPECIFIED: ICD-10-CM

## 2022-05-02 DIAGNOSIS — Z98.890 OTHER SPECIFIED POSTPROCEDURAL STATES: Chronic | ICD-10-CM

## 2022-05-02 PROCEDURE — 72197 MRI PELVIS W/O & W/DYE: CPT | Mod: 26

## 2022-05-02 PROCEDURE — 74183 MRI ABD W/O CNTR FLWD CNTR: CPT | Mod: 26

## 2022-05-10 ENCOUNTER — APPOINTMENT (OUTPATIENT)
Dept: OTOLARYNGOLOGY | Facility: CLINIC | Age: 57
End: 2022-05-10

## 2022-06-13 ENCOUNTER — APPOINTMENT (OUTPATIENT)
Dept: OTOLARYNGOLOGY | Facility: CLINIC | Age: 57
End: 2022-06-13
Payer: COMMERCIAL

## 2022-06-13 VITALS
HEIGHT: 66 IN | DIASTOLIC BLOOD PRESSURE: 95 MMHG | WEIGHT: 150 LBS | OXYGEN SATURATION: 99 % | HEART RATE: 73 BPM | SYSTOLIC BLOOD PRESSURE: 133 MMHG | BODY MASS INDEX: 24.11 KG/M2

## 2022-06-13 PROCEDURE — 99214 OFFICE O/P EST MOD 30 MIN: CPT | Mod: 25

## 2022-06-13 PROCEDURE — 76536 US EXAM OF HEAD AND NECK: CPT

## 2022-06-13 PROCEDURE — 31575 DIAGNOSTIC LARYNGOSCOPY: CPT

## 2022-06-13 NOTE — REASON FOR VISIT
[Subsequent Evaluation] : a subsequent evaluation for [[employer] ] : provided by [employer]   [FreeTextEntry2] : f/u tonsil cancer [Interpreters_IDNumber] : [id number] [Interpreters_FullName] : pedro [TWNoteComboBox1] : Mauritanian

## 2022-06-13 NOTE — PROCEDURE
[Gag Reflex] : gag reflex preventing mirror examination [None] : none [Flexible Endoscope] : examined with the flexible endoscope [Serial Number: ___] : Serial Number: [unfilled] [de-identified] : No lesions in the NPx, OPx, HPx or larynx.  Expected posttreatment changes, VC are mobile, airway patent.\par  [de-identified] : tonsil SCCa

## 2022-06-13 NOTE — PHYSICAL EXAM
[Laryngoscopy Performed] : laryngoscopy was performed, see procedure section for findings [Normal] : no rashes [de-identified] : Posttreatment changes, no LAD.   [FreeTextEntry1] : Trismus is stable with interincisor opening of approx. 2.5 cm.  No concerning lesions in the OC/OPx on inspection or palpation.  Posttreatment changes are stable. \par

## 2022-06-13 NOTE — HISTORY OF PRESENT ILLNESS
[de-identified] : Pt is here to f/up s/p CXRT for R tonsil SCCa on 1/2021 .  pt is being f/u with Dr. Young for his thyroid problem and no  thyroid biopsy perform due to thyroiditis and now thyroid med.  last ct neck and chest on 12/4/2021 and ct chest showed concerned liver lesion and  12/28/2021 mri of liver 1.8 cm lesion in the right hepatic lobe; differential includes a hemangioma, however is indeterminate; close interval follow-up is recommended with a MRI of the abdomen in 3 months.\par pt had a repeat MRI on 5/23/2022- Stable 1.7 cm indeterminate lesion in the right hepatic lobe, since prior 12/28/2021. Continued follow-up with MRI is recommended in 6 months. Pt c/o mild mouth discomfort on and off, able to tolerate PO and eating regular diet, gaining wt.  He denies any dyspnea, otalgia and dysphagia.\par \par

## 2022-06-13 NOTE — DATA REVIEWED
[de-identified] : US today\par R. thyroid lobe 2.38 x 2.41 x 5.25 cm.  Homogenous without nodules.\par L. thyroid lobe 2.01 x 2.46 x 4.93 cm.  There is a small nodule with macrocalcification and shadowing in the upper/mid pole measuring 7.2 x 6.0 x 6.6 mm.\par Isthmus - 2mm\par No pathologic LAD.

## 2022-07-20 ENCOUNTER — APPOINTMENT (OUTPATIENT)
Dept: RADIATION ONCOLOGY | Facility: CLINIC | Age: 57
End: 2022-07-20

## 2022-07-20 VITALS
BODY MASS INDEX: 24.21 KG/M2 | WEIGHT: 150 LBS | RESPIRATION RATE: 16 BRPM | SYSTOLIC BLOOD PRESSURE: 150 MMHG | OXYGEN SATURATION: 97 % | HEART RATE: 87 BPM | DIASTOLIC BLOOD PRESSURE: 90 MMHG

## 2022-07-20 PROCEDURE — 99213 OFFICE O/P EST LOW 20 MIN: CPT

## 2022-07-20 NOTE — ASSESSMENT
[No evidence of disease] : No evidence of disease [FreeTextEntry1] : Modest degree of treatment related sequelae resolving. Being managed for benign hypothyroidism.  Seeing dentist for pain when biting into food.

## 2022-07-20 NOTE — REVIEW OF SYSTEMS
[Negative] : Allergic/Immunologic [Dysphagia] : no dysphagia [Loss of Hearing] : no loss of hearing [Nosebleeds] : no nosebleeds [Hoarseness] : no hoarseness [Mucosal Pain] : no mucosal pain [FreeTextEntry4] : some persistent taste alterations, and dry mouth; teeth pain with eating has largely resolved. [FreeTextEntry9] : notes moderate diffuse bony achiness [de-identified] : s/p chemotherapy

## 2022-07-20 NOTE — DISEASE MANAGEMENT
[Clinical] : TNM Stage: c [III] : III [FreeTextEntry4] : squamous cell carcinoma [TTNM] : 4 [NTNM] : 2 [MTNM] : 0 [de-identified] : 7000 cGy [de-identified] : tonsillar fossa

## 2022-07-20 NOTE — VITALS
[Maximal Pain Intensity: 3/10] : 3/10 [Least Pain Intensity: 0/10] : 0/10 [Pain Description/Quality: ___] : Pain description/quality: [unfilled] [Pain Duration: ___] : Pain duration: [unfilled] [Pain Location: ___] : Pain Location: [unfilled] [Pain Interferes with ADLs] : Pain interferes with activities of daily living. [NoTreatment Scheduled] : no treatment scheduled [80: Normal activity with effort; some signs or symptoms of disease.] : 80: Normal activity with effort; some signs or symptoms of disease.  [ECOG Performance Status: 1 - Restricted in physically strenuous activity but ambulatory and able to carry out work of a light or sedentary nature] : Performance Status: 1 - Restricted in physically strenuous activity but ambulatory and able to carry out work of a light or sedentary nature, e.g., light house work, office work

## 2022-07-20 NOTE — LETTER CLOSING
[Sincerely yours,] : Sincerely yours, [FreeTextEntry3] : Lonnie Strong MD\par Physician in Chief\par Department of Radiation Medicine\par [facility] [place]\par [facility] [place]\par \par  of Radiation Medicine\par [facility]\par at  [facility] [place]\par \par Radiation \par [facility]/\par [facility] Imaging at [place]\par [address]\par [place], [place] [address]\par \par Tel: [phone]\par Fax: [phone]\par

## 2022-07-20 NOTE — HISTORY OF PRESENT ILLNESS
[FreeTextEntry1] : This 55 y/o Italian speaking male presents for routine follow-up.  Mr. 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 Completing 6 cycles of weekly Cisplatin and 7000 cGy radiation therapy on 1/15/2021.\par \par Mr. Rubio notes trismus continues to improve.  Denies otalgia, tinnitus.\par Reports mild oral dyness and intermittent dysgeusia.\par Reports eating and drinking most foods without difficulty, however, intermittent  mild dysphagia continues.\par Also reports that his teeth hurt when biting into food.  Notes his dentist offers to remove all his teeth.  Patient states he has switched  dentists   .

## 2022-07-20 NOTE — REASON FOR VISIT
[Routine Follow-Up] : routine follow-up visit for [Head and Neck Cancer] : head and neck cancer [Other: _____] : [unfilled] [Interpreters_IDNumber] : [id number] [Interpreters_FullName] : Latrice [FreeTextEntry3] : [place]inean [TWNoteComboBox1] : Gabonese

## 2022-07-20 NOTE — PHYSICAL EXAM
[Feeding Tube] : a feeding tube was present [Normal] : oriented to person, place and time, the affect was normal, the mood was normal and not anxious [de-identified] : missing some teeth posteriorly in mandibular alveolar ridge.; otherwise oral and mirror exam of hypopharynx /larynx unremarkable

## 2022-09-12 ENCOUNTER — APPOINTMENT (OUTPATIENT)
Dept: OTOLARYNGOLOGY | Facility: CLINIC | Age: 57
End: 2022-09-12

## 2022-09-12 VITALS
BODY MASS INDEX: 25.23 KG/M2 | WEIGHT: 157 LBS | HEART RATE: 74 BPM | OXYGEN SATURATION: 98 % | SYSTOLIC BLOOD PRESSURE: 172 MMHG | DIASTOLIC BLOOD PRESSURE: 109 MMHG | HEIGHT: 66 IN

## 2022-09-12 PROCEDURE — 99214 OFFICE O/P EST MOD 30 MIN: CPT | Mod: 25

## 2022-09-12 PROCEDURE — 31575 DIAGNOSTIC LARYNGOSCOPY: CPT

## 2022-09-12 NOTE — PROCEDURE
[Gag Reflex] : gag reflex preventing mirror examination [None] : none [Flexible Endoscope] : examined with the flexible endoscope [Serial Number: ___] : Serial Number: [unfilled] [de-identified] : No lesions in the NPx, OPx, HPx or larynx.  Expected posttreatment changes, VC are mobile, airway patent.\par  [de-identified] : tonsil SCCa

## 2022-09-12 NOTE — HISTORY OF PRESENT ILLNESS
[de-identified] : Pt is here to f/up s/p CXRT for R tonsil SCCa on 1/2021.  pt is being f/u with his new Endo Dr. Tam for his thyroid problem and no  thyroid biopsy perform due to thyroiditis and now thyroid med.  last ct neck and chest on 12/4/2021 and ct chest showed concerned liver lesion and  12/28/2021 mri of liver 1.8 cm lesion in the right hepatic lobe; differential includes a hemangioma, however is indeterminate; close interval follow-up is recommended. Repeat MRI on 5/23/2022- Stable 1.7 cm indeterminate lesion in the right hepatic lobe, since prior 12/28/2021, recommended in 6 months. Pt has no c.o, sometime nausea, and gum pain. pt is able to tolerate PO and eating regular diet, gaining wt.  He denies any dyspnea, otalgia and dysphagia.\par \par

## 2022-09-12 NOTE — REASON FOR VISIT
[Subsequent Evaluation] : a subsequent evaluation for [Source: ______] : History obtained from [unfilled] [FreeTextEntry2] : f/u tonsil cancer [Interpreters_IDNumber] : [id number] [Interpreters_FullName] : librado [TWNoteComboBox1] : Tanzanian

## 2022-09-12 NOTE — PHYSICAL EXAM
[Laryngoscopy Performed] : laryngoscopy was performed, see procedure section for findings [Normal] : no rashes [de-identified] : Posttreatment changes, no LAD.   [FreeTextEntry1] : Trismus is stable with interincisor opening of approx. 2.5 cm.  No concerning lesions in the OC/OPx on inspection or palpation.  Posttreatment changes are stable. \par

## 2022-11-10 ENCOUNTER — RESULT REVIEW (OUTPATIENT)
Age: 57
End: 2022-11-10

## 2022-11-23 ENCOUNTER — APPOINTMENT (OUTPATIENT)
Dept: RADIATION ONCOLOGY | Facility: CLINIC | Age: 57
End: 2022-11-23

## 2022-11-28 ENCOUNTER — OUTPATIENT (OUTPATIENT)
Dept: OUTPATIENT SERVICES | Facility: HOSPITAL | Age: 57
LOS: 1 days | End: 2022-11-28

## 2022-11-28 ENCOUNTER — APPOINTMENT (OUTPATIENT)
Dept: CT IMAGING | Facility: CLINIC | Age: 57
End: 2022-11-28
Payer: COMMERCIAL

## 2022-11-28 DIAGNOSIS — Z98.890 OTHER SPECIFIED POSTPROCEDURAL STATES: Chronic | ICD-10-CM

## 2022-11-28 DIAGNOSIS — C09.9 MALIGNANT NEOPLASM OF TONSIL, UNSPECIFIED: ICD-10-CM

## 2022-11-28 PROCEDURE — 71260 CT THORAX DX C+: CPT | Mod: 26

## 2022-11-28 PROCEDURE — 70491 CT SOFT TISSUE NECK W/DYE: CPT | Mod: 26

## 2022-12-08 ENCOUNTER — APPOINTMENT (OUTPATIENT)
Dept: RADIATION ONCOLOGY | Facility: CLINIC | Age: 57
End: 2022-12-08

## 2022-12-08 VITALS — BODY MASS INDEX: 25.66 KG/M2 | WEIGHT: 159 LBS

## 2022-12-08 DIAGNOSIS — R25.2 CRAMP AND SPASM: ICD-10-CM

## 2022-12-08 PROCEDURE — 99213 OFFICE O/P EST LOW 20 MIN: CPT

## 2022-12-08 NOTE — VITALS
[Maximal Pain Intensity: 1/10] : 1/10 [Least Pain Intensity: 0/10] : 0/10 [Pain Description/Quality: ___] : Pain description/quality: [unfilled] [Pain Duration: ___] : Pain duration: [unfilled] [Pain Location: ___] : Pain Location: [unfilled] [Pain Interferes with ADLs] : Pain interferes with activities of daily living. [NoTreatment Scheduled] : no treatment scheduled [80: Normal activity with effort; some signs or symptoms of disease.] : 80: Normal activity with effort; some signs or symptoms of disease.  [ECOG Performance Status: 1 - Restricted in physically strenuous activity but ambulatory and able to carry out work of a light or sedentary nature] : Performance Status: 1 - Restricted in physically strenuous activity but ambulatory and able to carry out work of a light or sedentary nature, e.g., light house work, office work

## 2022-12-09 PROBLEM — R25.2 TRISMUS: Status: ACTIVE | Noted: 2020-10-27

## 2022-12-09 NOTE — REVIEW OF SYSTEMS
[Negative] : Allergic/Immunologic [Dysphagia] : no dysphagia [Loss of Hearing] : no loss of hearing [Nosebleeds] : no nosebleeds [Hoarseness] : no hoarseness [Mucosal Pain] : no mucosal pain [FreeTextEntry4] : some persistent taste alterations, and dry mouth; teeth pain with eating has largely resolved. [FreeTextEntry9] : notes moderate diffuse bony achiness [de-identified] : s/p chemotherapy

## 2022-12-09 NOTE — REASON FOR VISIT
[Routine Follow-Up] : routine follow-up visit for [Head and Neck Cancer] : head and neck cancer [Other: _____] : [unfilled] [Interpreters_IDNumber] : [id number] [Interpreters_FullName] : Latrice [FreeTextEntry3] : Bruneian [TWNoteComboBox1] : Citizen of [place]

## 2022-12-09 NOTE — DISEASE MANAGEMENT
[Clinical] : TNM Stage: c [III] : III [FreeTextEntry4] : squamous cell carcinoma [TTNM] : 4 [NTNM] : 2 [MTNM] : 0 [de-identified] : 7000 cGy [de-identified] : tonsillar fossa

## 2022-12-09 NOTE — LETTER CLOSING
[Sincerely yours,] : Sincerely yours, [FreeTextEntry3] : Lonnie Strong MD\par Physician in Chief\par Department of Radiation Medicine\par [facility] [place]\par [facility] [place]\par \par  of Radiation Medicine\par [facility]\par at  [facility]\par \par Radiation \par [facility]/\par [facility] Imaging at [place]\par [address]\par [place], [place] [address]\par \par Tel: [phone]\par Fax: [phone]\par

## 2022-12-09 NOTE — PHYSICAL EXAM
[Feeding Tube] : a feeding tube was present [Normal] : oriented to person, place and time, the affect was normal, the mood was normal and not anxious [de-identified] : missing some teeth in mandibular alveolar ridge; otherwise oral and mirror exam of hypopharynx /larynx unremarkable

## 2022-12-09 NOTE — HISTORY OF PRESENT ILLNESS
[FreeTextEntry1] : This 56 y/o Botswanan speaking male presents for routine follow-up.  Mr. 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  He is s/p ChemoRT. Completed 6 cycles of weekly Cisplatin concurrently with radiation therapy (7000 cGy) radiation therapy on 1/15/2021.\par \par Mr. Rubio notes trismus continues to improve.  Denies otalgia, tinnitus.\par Reports mild oral dryness.\par Reports eating and drinking most foods without difficulty.\par Notes intermittent tooth/gum pain.

## 2022-12-12 ENCOUNTER — APPOINTMENT (OUTPATIENT)
Dept: OTOLARYNGOLOGY | Facility: CLINIC | Age: 57
End: 2022-12-12
Payer: COMMERCIAL

## 2022-12-12 VITALS
HEIGHT: 66 IN | BODY MASS INDEX: 25.55 KG/M2 | DIASTOLIC BLOOD PRESSURE: 83 MMHG | SYSTOLIC BLOOD PRESSURE: 135 MMHG | HEART RATE: 80 BPM | WEIGHT: 159 LBS | OXYGEN SATURATION: 99 %

## 2022-12-12 DIAGNOSIS — R13.10 DYSPHAGIA, UNSPECIFIED: ICD-10-CM

## 2022-12-12 PROCEDURE — 31575 DIAGNOSTIC LARYNGOSCOPY: CPT

## 2022-12-12 PROCEDURE — 99214 OFFICE O/P EST MOD 30 MIN: CPT | Mod: 25

## 2022-12-12 NOTE — HISTORY OF PRESENT ILLNESS
[de-identified] : Pt is here to f/up s/p CXRT for R tonsil SCCa on 1/2021.  pt is being f/u with his new Endo Dr. Tam for his thyroid problem and no  thyroid biopsy perform due to thyroiditis and now thyroid med.  Repeat MRI on 5/23/2022- Stable 1.7 cm indeterminate lesion in the right hepatic lobe, since prior 12/28/2021. Last ct neck and chest on 11/28/2022. No MRI done recently.  Pt has no major c.o.  sometime nausea, and gum pain assoc hot food. pt is able to tolerate PO and eating regular diet, gaining wt.  He denies any dyspnea, otalgia and dysphagia.\par \par

## 2022-12-12 NOTE — PROCEDURE
[Trismus] : trismus preventing mirror examination [None] : none [Flexible Endoscope] : examined with the flexible endoscope [Serial Number: ___] : Serial Number: [unfilled] [de-identified] : No lesions in the NPx, OPx, HPx or larynx.  Expected posttreatment changes, VC are mobile, airway patent.\par  [de-identified] : tonsil SCCa

## 2022-12-12 NOTE — REASON FOR VISIT
[Subsequent Evaluation] : a subsequent evaluation for [Source: ______] : History obtained from [unfilled] [FreeTextEntry2] : f/u tonsil cancer [Interpreters_IDNumber] : [id number] [Interpreters_FullName] : librado [TWNoteComboBox1] : Citizen of [place]

## 2022-12-12 NOTE — PHYSICAL EXAM
[Laryngoscopy Performed] : laryngoscopy was performed, see procedure section for findings [Normal] : no rashes [de-identified] : Posttreatment changes, no LAD.   [FreeTextEntry1] : Trismus is stable with interincisor opening of approx. 2.5 cm.  No concerning lesions in the OC/OPx on inspection or palpation.  Posttreatment changes are stable. \par

## 2023-01-20 ENCOUNTER — APPOINTMENT (OUTPATIENT)
Dept: ENDOCRINOLOGY | Facility: CLINIC | Age: 58
End: 2023-01-20
Payer: COMMERCIAL

## 2023-01-20 VITALS
BODY MASS INDEX: 26.52 KG/M2 | HEIGHT: 66 IN | SYSTOLIC BLOOD PRESSURE: 140 MMHG | WEIGHT: 165 LBS | DIASTOLIC BLOOD PRESSURE: 90 MMHG

## 2023-01-20 PROCEDURE — 99214 OFFICE O/P EST MOD 30 MIN: CPT

## 2023-01-20 NOTE — ASSESSMENT
[FreeTextEntry1] : : Travon [id number]\par 57 y.o. Male with Hx of tonsil cancer, s/p Chemo and RTX and Hypothyroidism secondary to cancer Tx, presents for follow up. He is a patient of Dr. Tam. Last time seen in 3/2022.  States he is feeling well today. No wt loss reported. TPO were high in 3/2021 - likely immune response. \par \par Currently on Levothyroxine 88 mcg Daily\par \par # Hypothyroidism\par Likely cancer treatment related\par However, positive TPO, suggestive of autoimmune response\par Clinically euthyroid\par Last TSH (1/9/23) mildly elevated 6.06, Normal FT4 1.2\par Patient states he did not take medications for few days because he was away from his home.\par Will repeat TFTs in 1 month again to re-asses. Will call if adjustments are needed. \par Continue current dose of LT4   88 mcg daily. \par \par # Thyroid nodule.\par Last US in 3/2022\par Needs f/u for left nodule 0.7 x 0.7 x 0.6 cm with coarse calcification\par Patient did not repeat US. Will refer him again.\par \par # HLD\par Advised low fat/cholesterol diet.  \par \par RTO in 6 months. \par \par \par

## 2023-01-20 NOTE — PHYSICAL EXAM
[Alert] : alert [No Acute Distress] : no acute distress [Well Developed] : well developed [Normal Voice/Communication] : normal voice communication [Normal Sclera/Conjunctiva] : normal sclera/conjunctiva [EOMI] : extra ocular movement intact [PERRL] : pupils equal, round and reactive to light [Normal Outer Ear/Nose] : the ears and nose were normal in appearance [Normal Hearing] : hearing was normal [Normal Oropharynx] : the oropharynx was normal [Thyroid Not Enlarged] : the thyroid was not enlarged [No Thyroid Nodules] : no palpable thyroid nodules [Clear to Auscultation] : lungs were clear to auscultation bilaterally [Normal Rate] : heart rate was normal [Regular Rhythm] : with a regular rhythm [No Edema] : no peripheral edema [Normal Bowel Sounds] : normal bowel sounds [Soft] : abdomen soft [Normal Supraclavicular Nodes] : no supraclavicular lymphadenopathy [Normal Anterior Cervical Nodes] : no anterior cervical lymphadenopathy [Normal Posterior Cervical Nodes] : no posterior cervical lymphadenopathy [No Clubbing, Cyanosis] : no clubbing  or cyanosis of the fingernails [No Rash] : no rash [Normal Reflexes] : deep tendon reflexes were 2+ and symmetric [No Tremors] : no tremors [Oriented x3] : oriented to person, place, and time [Normal Affect] : the affect was normal [Normal Mood] : the mood was normal

## 2023-01-20 NOTE — HISTORY OF PRESENT ILLNESS
[FreeTextEntry1] : : Travon [id number]\par 57 y.o. Male with Hx of tonsil cancer, s/p Chemo and RTX and Hypothyroidism secondary to cancer Tx, presents for follow up. He is a patient  Dr. Tam. Last time seen in 3/2022.  States he is feeling well today. No wt loss reported. TPO were high in 3/2021 - likely immune response. \par \par Currently on Levothyroxine 88 mcg Daily\par

## 2023-03-03 ENCOUNTER — OUTPATIENT (OUTPATIENT)
Dept: OUTPATIENT SERVICES | Facility: HOSPITAL | Age: 58
LOS: 1 days | End: 2023-03-03

## 2023-03-03 ENCOUNTER — APPOINTMENT (OUTPATIENT)
Dept: MRI IMAGING | Facility: CLINIC | Age: 58
End: 2023-03-03
Payer: COMMERCIAL

## 2023-03-03 DIAGNOSIS — Z98.890 OTHER SPECIFIED POSTPROCEDURAL STATES: Chronic | ICD-10-CM

## 2023-03-03 DIAGNOSIS — K76.9 LIVER DISEASE, UNSPECIFIED: ICD-10-CM

## 2023-03-03 PROCEDURE — 74183 MRI ABD W/O CNTR FLWD CNTR: CPT | Mod: 26

## 2023-03-03 PROCEDURE — 72197 MRI PELVIS W/O & W/DYE: CPT | Mod: 26

## 2023-03-13 ENCOUNTER — APPOINTMENT (OUTPATIENT)
Dept: OTOLARYNGOLOGY | Facility: CLINIC | Age: 58
End: 2023-03-13
Payer: COMMERCIAL

## 2023-03-13 VITALS
WEIGHT: 165 LBS | SYSTOLIC BLOOD PRESSURE: 156 MMHG | DIASTOLIC BLOOD PRESSURE: 102 MMHG | TEMPERATURE: 96.3 F | HEIGHT: 66 IN | BODY MASS INDEX: 26.52 KG/M2

## 2023-03-13 LAB
25(OH)D3 SERPL-MCNC: 53 NG/ML
ALBUMIN SERPL ELPH-MCNC: 4.4 G/DL
ALP BLD-CCNC: 66 U/L
ALT SERPL-CCNC: 10 U/L
ANION GAP SERPL CALC-SCNC: 9 MMOL/L
APPEARANCE: CLEAR
AST SERPL-CCNC: 14 U/L
BASOPHILS # BLD AUTO: 0.06 K/UL
BASOPHILS NFR BLD AUTO: 0.8 %
BILIRUB SERPL-MCNC: 0.9 MG/DL
BILIRUBIN URINE: NEGATIVE
BLOOD URINE: NEGATIVE
BUN SERPL-MCNC: 8 MG/DL
CALCIUM SERPL-MCNC: 9.4 MG/DL
CHLORIDE SERPL-SCNC: 96 MMOL/L
CHOLEST SERPL-MCNC: 185 MG/DL
CO2 SERPL-SCNC: 31 MMOL/L
COLOR: COLORLESS
CREAT SERPL-MCNC: 0.95 MG/DL
CREAT SPEC-SCNC: 51 MG/DL
EGFR: 93 ML/MIN/1.73M2
EOSINOPHIL # BLD AUTO: 0.23 K/UL
EOSINOPHIL NFR BLD AUTO: 2.9 %
ESTIMATED AVERAGE GLUCOSE: 91 MG/DL
GLUCOSE QUALITATIVE U: NEGATIVE
GLUCOSE SERPL-MCNC: 108 MG/DL
HBA1C MFR BLD HPLC: 4.8 %
HCT VFR BLD CALC: 47.7 %
HDLC SERPL-MCNC: 51 MG/DL
HGB BLD-MCNC: 15.4 G/DL
IMM GRANULOCYTES NFR BLD AUTO: 0.4 %
KETONES URINE: NEGATIVE
LDLC SERPL CALC-MCNC: 116 MG/DL
LEUKOCYTE ESTERASE URINE: NEGATIVE
LYMPHOCYTES # BLD AUTO: 1.77 K/UL
LYMPHOCYTES NFR BLD AUTO: 22.3 %
MAGNESIUM SERPL-MCNC: 2 MG/DL
MAN DIFF?: NORMAL
MCHC RBC-ENTMCNC: 30.3 PG
MCHC RBC-ENTMCNC: 32.3 GM/DL
MCV RBC AUTO: 93.7 FL
MICROALBUMIN 24H UR DL<=1MG/L-MCNC: <1.2 MG/DL
MICROALBUMIN/CREAT 24H UR-RTO: NORMAL MG/G
MONOCYTES # BLD AUTO: 0.69 K/UL
MONOCYTES NFR BLD AUTO: 8.7 %
NEUTROPHILS # BLD AUTO: 5.14 K/UL
NEUTROPHILS NFR BLD AUTO: 64.9 %
NITRITE URINE: NEGATIVE
NONHDLC SERPL-MCNC: 134 MG/DL
PH URINE: 6.5
PHOSPHATE SERPL-MCNC: 4 MG/DL
PLATELET # BLD AUTO: 238 K/UL
POTASSIUM SERPL-SCNC: 5 MMOL/L
PROT SERPL-MCNC: 7.4 G/DL
PROTEIN URINE: NEGATIVE
RBC # BLD: 5.09 M/UL
RBC # FLD: 12.5 %
SODIUM SERPL-SCNC: 136 MMOL/L
SPECIFIC GRAVITY URINE: 1.01
T3FREE SERPL-MCNC: 2.85 PG/ML
T4 FREE SERPL-MCNC: 1.2 NG/DL
TRIGL SERPL-MCNC: 90 MG/DL
TSH SERPL-ACNC: 6.06 UIU/ML
UROBILINOGEN URINE: NORMAL
VIT B12 SERPL-MCNC: 727 PG/ML
WBC # FLD AUTO: 7.92 K/UL

## 2023-03-13 PROCEDURE — 99214 OFFICE O/P EST MOD 30 MIN: CPT | Mod: 25

## 2023-03-13 PROCEDURE — 31575 DIAGNOSTIC LARYNGOSCOPY: CPT

## 2023-03-13 NOTE — PROCEDURE
[Trismus] : trismus preventing mirror examination [None] : none [Flexible Endoscope] : examined with the flexible endoscope [Serial Number: ___] : Serial Number: [unfilled] [de-identified] : No lesions in the NPx, OPx, HPx or larynx.  Expected posttreatment changes, VC are mobile, airway patent.\par  [de-identified] : tonsil SCCa

## 2023-03-13 NOTE — REASON FOR VISIT
[Subsequent Evaluation] : a subsequent evaluation for [Source: ______] : History obtained from [unfilled] [FreeTextEntry2] : f/u tonsil cancer

## 2023-03-13 NOTE — HISTORY OF PRESENT ILLNESS
[de-identified] : Pt is here to f/up s/p CXRT for R tonsil SCCa on 1/2021.  pt is being f/u with his new Endo Dr. Tam for his thyroid problem and no thyroid biopsy perform due to thyroiditis and now thyroid med.  Last ct neck and chest on 11/28/2022. 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 pt is working with dentist for extraction of the tooth.  He denies any dyspnea, otalgia and dysphagia.\par \par

## 2023-03-13 NOTE — PHYSICAL EXAM
[Laryngoscopy Performed] : laryngoscopy was performed, see procedure section for findings [Normal] : no rashes [de-identified] : Posttreatment changes, no LAD.   [FreeTextEntry1] : Trismus is stable with interincisor opening of approx. 2.5 cm.  No concerning lesions in the OC/OPx on inspection or palpation.  Posttreatment changes are stable. \par

## 2023-05-08 RX ORDER — LEVOTHYROXINE SODIUM 100 UG/1
100 TABLET ORAL DAILY
Qty: 90 | Refills: 1 | Status: ACTIVE | COMMUNITY
Start: 2021-03-23 | End: 1900-01-01

## 2023-06-08 ENCOUNTER — APPOINTMENT (OUTPATIENT)
Dept: RADIATION ONCOLOGY | Facility: CLINIC | Age: 58
End: 2023-06-08
Payer: COMMERCIAL

## 2023-06-08 VITALS
RESPIRATION RATE: 16 BRPM | HEART RATE: 88 BPM | SYSTOLIC BLOOD PRESSURE: 160 MMHG | WEIGHT: 165 LBS | OXYGEN SATURATION: 96 % | DIASTOLIC BLOOD PRESSURE: 90 MMHG | BODY MASS INDEX: 26.63 KG/M2

## 2023-06-08 DIAGNOSIS — Z92.3 PERSONAL HISTORY OF IRRADIATION: ICD-10-CM

## 2023-06-08 DIAGNOSIS — C09.9 MALIGNANT NEOPLASM OF TONSIL, UNSPECIFIED: ICD-10-CM

## 2023-06-08 PROCEDURE — 31231 NASAL ENDOSCOPY DX: CPT

## 2023-06-08 PROCEDURE — 99213 OFFICE O/P EST LOW 20 MIN: CPT | Mod: 25

## 2023-06-08 NOTE — REVIEW OF SYSTEMS
[Recent Change In Weight] : ~T recent weight change [Nosebleeds] : nosebleeds [Negative] : Allergic/Immunologic [FreeTextEntry2] : slight gain (<10 lbs). [FreeTextEntry4] : occasional with spring hay fever allergy season. Altered taste sensation at times

## 2023-06-08 NOTE — LETTER GREETING
[Dear Doctor] : Dear Doctor, [Follow-Up] : Your patient, [unfilled] was seen in my office today for follow-up [Please see my note below.] : Please see my note below. [FreeTextEntry2] : Juan Baez MD

## 2023-06-08 NOTE — ASSESSMENT
[No evidence of disease] : No evidence of disease [FreeTextEntry1] : modest minimal stable treatment related sequelae.

## 2023-06-08 NOTE — DISEASE MANAGEMENT
[Clinical] : TNM Stage: c [III] : III [FreeTextEntry4] : squamous cell carcinoma [TTNM] : 4 [NTNM] : 2 [MTNM] : 0 [de-identified] : 7000 cGy [de-identified] : tonsillar fossa

## 2023-06-08 NOTE — LETTER CLOSING
[Sincerely yours,] : Sincerely yours, [FreeTextEntry3] : Lonnie Strong MD\par Physician in Chief\par Department of Radiation Medicine\par [facility] [place]\par [facility] [place]\par \par  of Radiation Medicine\par [facility]\par at  [facility]\par \par Radiation \par [facility]/\par [facility] Imaging at [place]\par [address]\par [place], [place] [address]\par \par Tel: [phone]\par Fax: [phone]\par

## 2023-06-08 NOTE — PHYSICAL EXAM
[Normal] : oriented to person, place and time, the affect was normal, the mood was normal and not anxious [de-identified] : fair to poor dentition. No evidence of aggie dental infecton. Post radiation changes in area of right tonsil.

## 2023-06-08 NOTE — HISTORY OF PRESENT ILLNESS
[FreeTextEntry1] : This 58 y/o Bolivian speaking male presents for routine follow-up. Mr. 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  He is s/p Chemo/RT. Completed 6 cycles of weekly Cisplatin concurrently with radiation therapy (7000 cGy) radiation therapy on 1/15/2021.\par \par In his last visit, Mr. Rubio noted trismus continue to improve.  Denies otalgia, tinnitus. Reported mild oral dryness.Noted intermittent tooth/gum pain.\par \par His scan in the past had shown ill defined enhancing peripheral right hepatic lesion. Recent MRI- 3/3/2023- A/P- continued decrease in size of hyperenhancing right hepatic lesion, suggesting benignity. 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 He follows with  Dr. Baez.\par \par 6/7/2023- today....patient reports no lockjaw,no tinnitus,no ear pain,no nausea no vomitting no lethargy.\par patient recently went to dentist for tooth pain may need extractions

## 2023-06-08 NOTE — REASON FOR VISIT
[Routine Follow-Up] : routine follow-up visit for [Head and Neck Cancer] : head and neck cancer [Other: _____] : [unfilled] [Interpreters_IDNumber] : [id number] [Interpreters_FullName] : Mason [TWNoteComboBox1] : Canadian

## 2023-07-03 ENCOUNTER — EMERGENCY (EMERGENCY)
Facility: HOSPITAL | Age: 58
LOS: 1 days | Discharge: HOME | End: 2023-07-03
Attending: STUDENT IN AN ORGANIZED HEALTH CARE EDUCATION/TRAINING PROGRAM
Payer: COMMERCIAL

## 2023-07-03 VITALS
RESPIRATION RATE: 16 BRPM | HEART RATE: 71 BPM | OXYGEN SATURATION: 97 % | DIASTOLIC BLOOD PRESSURE: 108 MMHG | SYSTOLIC BLOOD PRESSURE: 162 MMHG

## 2023-07-03 VITALS
SYSTOLIC BLOOD PRESSURE: 183 MMHG | DIASTOLIC BLOOD PRESSURE: 102 MMHG | HEIGHT: 68 IN | OXYGEN SATURATION: 95 % | RESPIRATION RATE: 18 BRPM | WEIGHT: 164.69 LBS | TEMPERATURE: 98 F | HEART RATE: 95 BPM

## 2023-07-03 DIAGNOSIS — Z98.890 OTHER SPECIFIED POSTPROCEDURAL STATES: Chronic | ICD-10-CM

## 2023-07-03 PROCEDURE — 74176 CT ABD & PELVIS W/O CONTRAST: CPT | Mod: 26,MA

## 2023-07-03 PROCEDURE — T1013: CPT

## 2023-07-03 PROCEDURE — 72128 CT CHEST SPINE W/O DYE: CPT | Mod: 26,MA

## 2023-07-03 PROCEDURE — 93010 ELECTROCARDIOGRAM REPORT: CPT

## 2023-07-03 PROCEDURE — 74176 CT ABD & PELVIS W/O CONTRAST: CPT | Mod: MA

## 2023-07-03 PROCEDURE — 71045 X-RAY EXAM CHEST 1 VIEW: CPT | Mod: 26

## 2023-07-03 PROCEDURE — 71250 CT THORAX DX C-: CPT | Mod: 26,MA

## 2023-07-03 PROCEDURE — 71250 CT THORAX DX C-: CPT | Mod: MA

## 2023-07-03 PROCEDURE — 99284 EMERGENCY DEPT VISIT MOD MDM: CPT

## 2023-07-03 PROCEDURE — 93005 ELECTROCARDIOGRAM TRACING: CPT

## 2023-07-03 PROCEDURE — 72131 CT LUMBAR SPINE W/O DYE: CPT | Mod: 26,MA

## 2023-07-03 PROCEDURE — 99284 EMERGENCY DEPT VISIT MOD MDM: CPT | Mod: 25

## 2023-07-03 PROCEDURE — 96372 THER/PROPH/DIAG INJ SC/IM: CPT

## 2023-07-03 PROCEDURE — 71045 X-RAY EXAM CHEST 1 VIEW: CPT

## 2023-07-03 RX ORDER — METHOCARBAMOL 500 MG/1
1500 TABLET, FILM COATED ORAL ONCE
Refills: 0 | Status: COMPLETED | OUTPATIENT
Start: 2023-07-03 | End: 2023-07-03

## 2023-07-03 RX ORDER — ALPRAZOLAM 0.25 MG
0.25 TABLET ORAL ONCE
Refills: 0 | Status: DISCONTINUED | OUTPATIENT
Start: 2023-07-03 | End: 2023-07-03

## 2023-07-03 RX ORDER — AMLODIPINE BESYLATE 2.5 MG/1
1 TABLET ORAL
Qty: 30 | Refills: 0
Start: 2023-07-03 | End: 2023-08-01

## 2023-07-03 RX ORDER — KETOROLAC TROMETHAMINE 30 MG/ML
30 SYRINGE (ML) INJECTION ONCE
Refills: 0 | Status: DISCONTINUED | OUTPATIENT
Start: 2023-07-03 | End: 2023-07-03

## 2023-07-03 RX ORDER — LIDOCAINE 4 G/100G
1 CREAM TOPICAL ONCE
Refills: 0 | Status: COMPLETED | OUTPATIENT
Start: 2023-07-03 | End: 2023-07-03

## 2023-07-03 RX ORDER — METHOCARBAMOL 500 MG/1
2 TABLET, FILM COATED ORAL
Qty: 42 | Refills: 0
Start: 2023-07-03 | End: 2023-07-09

## 2023-07-03 RX ORDER — AMLODIPINE BESYLATE 2.5 MG/1
5 TABLET ORAL ONCE
Refills: 0 | Status: COMPLETED | OUTPATIENT
Start: 2023-07-03 | End: 2023-07-03

## 2023-07-03 RX ADMIN — METHOCARBAMOL 1500 MILLIGRAM(S): 500 TABLET, FILM COATED ORAL at 15:27

## 2023-07-03 RX ADMIN — Medication 0.25 MILLIGRAM(S): at 19:25

## 2023-07-03 RX ADMIN — AMLODIPINE BESYLATE 5 MILLIGRAM(S): 2.5 TABLET ORAL at 18:58

## 2023-07-03 RX ADMIN — Medication 30 MILLIGRAM(S): at 15:27

## 2023-07-03 RX ADMIN — LIDOCAINE 1 PATCH: 4 CREAM TOPICAL at 15:26
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
## 2023-07-03 NOTE — ED PROVIDER NOTE - CARE PROVIDER_API CALL
Josafat Dao  Pulmonary Disease  [address], [address]  [place] [address]  Phone: [phone]  Fax: [phone]  Follow Up Time:

## 2023-07-03 NOTE — ED PROVIDER NOTE - PATIENT PORTAL LINK FT
You can access the FollowMyHealth Patient Portal offered by [facility] by registering at the following website: [email or web address]. By joining "[employer]’s FollowMyHealth portal, you will also be able to view your health information using other applications (apps) compatible with our system.

## 2023-07-03 NOTE — ED PROVIDER NOTE - NSFOLLOWUPINSTRUCTIONS_ED_ALL_ED_FT
-Please follow up with Pulmonology regarding lung nodule found on CT.  -Please follow-up with your primary care doctor in the next 2 days.  Please call tomorrow for an appointment.  If you cannot follow-up with your primary care doctor please return to the ED for any urgent issues.  - You were given a copy of the tests performed today.  Please bring the results with you and review them with your primary care doctor.  - If you have any worsening of symptoms or any other concerns please return to the ED immediately.  - Please continue taking your home medications as directed.
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
## 2023-07-03 NOTE — ED ADULT NURSE REASSESSMENT NOTE - NS ED NURSE REASSESS COMMENT FT1
Assumed care of pt at 19:15 as stated in report from OLIVIA Mckee. Charting as noted. Patient A&O x4. denies pain/discomfort, denies CP/SOB. denies vision changes or headache. Updated on the plan of care. Call bell within reach, bed locked in lowest position. . No signs of acute distress noted, safety maintained.
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
## 2023-07-03 NOTE — ED PROVIDER NOTE - PROGRESS NOTE DETAILS
Office visit today.  VO per Henrique Land    1. Zoloft 50mg daily #30 with 6 refills    2. Alprazolam 0.25mg daily as needed # 15 with 0 refills      
Patient reassesed--no complaints.  Vital signs normal.  Resting comfortably.  A&Ox3.  Speech clear. Gait normal.  Workup is unremarkable for any emergent process. CT shows incidental lung nodule. Pt informed of result and to f/u outpatient w/ pulmonology.  Patient is now feeling improved and wishes to leave.  Patient / family members have capacity.    Patient/family was given full return precautions, counseled on red flag symptoms such as LOC, fever, severe pain, or focal deficits and advised to return to the ED for these reasons or any reason that was concerning to them. Patient/family was informed of all significant and incidental findings found on this workup today and all results were reviewed.   All questions were answered, advised to make close follow up with their primary care provider and specialty clinics (as applicable) to follow up with this visit and continue investigation/treatment.   Patient/family has shown adequate understanding and is agreeable to the plan. -Jaylan

## 2023-07-03 NOTE — ED PROVIDER NOTE - PHYSICAL EXAMINATION
Gen: Well appearing in NAD  Head: NC/AT  Neck: trachea midline, right cervical paraspinal tenderness, no midline tenderness, full range of motion  Back: + midline thoracic and lumbar tenderness, no step-off, no paraspinal tenderness.  Card: regular rate and rhythm  Resp:  CTAB  Abd: soft, non-distended, non-tender  Ext: no deformities above reported baseline  Neuro:  A&O, CN 2-12 intact, No tremors. No fasciculations. No nystagmus. Normal finger to nose and heel to shin b/l. No dysmetria.  Normal gait. Normal sensation. Normal strength.   No saddle anesthesia  Skin:  Warm and dry as visualized  Psych:  Normal affect and mood

## 2023-09-05 ENCOUNTER — APPOINTMENT (OUTPATIENT)
Dept: CT IMAGING | Facility: CLINIC | Age: 58
End: 2023-09-05
Payer: COMMERCIAL

## 2023-09-05 ENCOUNTER — OUTPATIENT (OUTPATIENT)
Dept: OUTPATIENT SERVICES | Facility: HOSPITAL | Age: 58
LOS: 1 days | End: 2023-09-05

## 2023-09-05 DIAGNOSIS — C09.9 MALIGNANT NEOPLASM OF TONSIL, UNSPECIFIED: ICD-10-CM

## 2023-09-05 PROCEDURE — 70491 CT SOFT TISSUE NECK W/DYE: CPT | Mod: 26

## 2023-09-05 PROCEDURE — 71260 CT THORAX DX C+: CPT | Mod: 26

## 2023-09-11 ENCOUNTER — APPOINTMENT (OUTPATIENT)
Dept: OTOLARYNGOLOGY | Facility: CLINIC | Age: 58
End: 2023-09-11
Payer: COMMERCIAL

## 2023-09-11 VITALS
RESPIRATION RATE: 16 BRPM | DIASTOLIC BLOOD PRESSURE: 97 MMHG | WEIGHT: 170 LBS | SYSTOLIC BLOOD PRESSURE: 154 MMHG | BODY MASS INDEX: 28.32 KG/M2 | OXYGEN SATURATION: 97 % | HEART RATE: 88 BPM | HEIGHT: 65 IN

## 2023-09-11 DIAGNOSIS — K76.9 LIVER DISEASE, UNSPECIFIED: ICD-10-CM

## 2023-09-11 DIAGNOSIS — E04.1 NONTOXIC SINGLE THYROID NODULE: ICD-10-CM

## 2023-09-11 DIAGNOSIS — E03.9 HYPOTHYROIDISM, UNSPECIFIED: ICD-10-CM

## 2023-09-11 DIAGNOSIS — C09.9 MALIGNANT NEOPLASM OF TONSIL, UNSPECIFIED: ICD-10-CM

## 2023-09-11 PROCEDURE — 31575 DIAGNOSTIC LARYNGOSCOPY: CPT

## 2023-09-11 PROCEDURE — 99214 OFFICE O/P EST MOD 30 MIN: CPT | Mod: 25

## 2023-09-11 RX ORDER — AMLODIPINE BESYLATE 10 MG/1
10 TABLET ORAL
Refills: 0 | Status: ACTIVE | COMMUNITY

## 2023-11-27 ENCOUNTER — APPOINTMENT (OUTPATIENT)
Dept: ENDOCRINOLOGY | Facility: CLINIC | Age: 58
End: 2023-11-27

## 2023-12-12 ENCOUNTER — APPOINTMENT (OUTPATIENT)
Dept: RADIATION ONCOLOGY | Facility: CLINIC | Age: 58
End: 2023-12-12

## 2024-03-25 ENCOUNTER — APPOINTMENT (OUTPATIENT)
Dept: OTOLARYNGOLOGY | Facility: CLINIC | Age: 59
End: 2024-03-25

## 2024-04-22 ENCOUNTER — RX RENEWAL (OUTPATIENT)
Age: 59
End: 2024-04-22

## 2024-04-22 RX ORDER — LEVOTHYROXINE SODIUM 0.1 MG/1
100 TABLET ORAL DAILY
Qty: 90 | Refills: 0 | Status: ACTIVE | COMMUNITY
Start: 2024-04-22 | End: 1900-01-01

## 2024-09-10 ENCOUNTER — APPOINTMENT (OUTPATIENT)
Dept: ENDOCRINOLOGY | Facility: CLINIC | Age: 59
End: 2024-09-10
Payer: MEDICAID

## 2024-09-10 VITALS
SYSTOLIC BLOOD PRESSURE: 128 MMHG | HEIGHT: 65 IN | WEIGHT: 187 LBS | BODY MASS INDEX: 31.16 KG/M2 | DIASTOLIC BLOOD PRESSURE: 80 MMHG | RESPIRATION RATE: 98 BRPM | HEART RATE: 93 BPM

## 2024-09-10 DIAGNOSIS — E03.9 HYPOTHYROIDISM, UNSPECIFIED: ICD-10-CM

## 2024-09-10 DIAGNOSIS — E06.3 AUTOIMMUNE THYROIDITIS: ICD-10-CM

## 2024-09-10 PROCEDURE — G2211 COMPLEX E/M VISIT ADD ON: CPT | Mod: NC

## 2024-09-10 PROCEDURE — 99213 OFFICE O/P EST LOW 20 MIN: CPT

## 2024-09-10 NOTE — HISTORY OF PRESENT ILLNESS
[FreeTextEntry1] :  Hx of tonsil cancer, s/p Chemo and RTX and Hypothyroidism secondary to cancer Tx, presents for follow up. He is a patient Dr. Tam   Currently on Levothyroxine 100 mcg Daily ran out 15 days ago No hypothyroid manifestation

## 2024-09-10 NOTE — ASSESSMENT
[FreeTextEntry1] : Hypothyroidism History of Hashimoto and also history of radiation for tonsillar cancer  Clinically euthyroid Biochemically euthyroid Good pill technique (early in the morning on empty stomach) On LT4 88 mcg   Thyroid nodule  Stable to slightly decreased 0.7 x 0.7 x 0.6 cm left thyroid nodule with coarse calcifications Recheck thyroid US now    I spent 30 minutes discussing with patient face to face and non face to face reviewing documentations, labs, and/or imaging, also discussing the management plans.   RTC in 3 months for 20 min

## 2024-12-16 NOTE — ED ADULT NURSE NOTE - PAIN RATING/NUMBER SCALE (0-10): ACTIVITY
----- Message from Timoteo Lombardi sent at 12/16/2024  9:13 AM CST -----  Hydrate with 1 L normal saline today and tomorrow-repeat BMP after second liter of IV fluids  ----- Message -----  From: Lab, Background User  Sent: 12/16/2024   8:40 AM CST  To: Timoteo Lombardi MD  
Patient to have fluids today and tomorrow.   
8 (severe pain)

## 2024-12-17 ENCOUNTER — APPOINTMENT (OUTPATIENT)
Dept: ENDOCRINOLOGY | Facility: CLINIC | Age: 59
End: 2024-12-17

## 2025-07-22 DIAGNOSIS — E06.3 AUTOIMMUNE THYROIDITIS: ICD-10-CM

## 2025-08-04 ENCOUNTER — NON-APPOINTMENT (OUTPATIENT)
Age: 60
End: 2025-08-04

## 2025-08-04 LAB
T4 FREE SERPL-MCNC: 1.5 NG/DL
T4 SERPL-MCNC: 9 UG/DL
TSH SERPL-ACNC: 3.08 UIU/ML

## 2025-08-20 ENCOUNTER — APPOINTMENT (OUTPATIENT)
Dept: ENDOCRINOLOGY | Facility: CLINIC | Age: 60
End: 2025-08-20
Payer: MEDICAID

## 2025-08-20 VITALS
HEIGHT: 65 IN | BODY MASS INDEX: 31.16 KG/M2 | SYSTOLIC BLOOD PRESSURE: 135 MMHG | OXYGEN SATURATION: 98 % | HEART RATE: 84 BPM | DIASTOLIC BLOOD PRESSURE: 88 MMHG | WEIGHT: 187 LBS

## 2025-08-20 DIAGNOSIS — E03.9 HYPOTHYROIDISM, UNSPECIFIED: ICD-10-CM

## 2025-08-20 PROCEDURE — G2211 COMPLEX E/M VISIT ADD ON: CPT | Mod: NC

## 2025-08-20 PROCEDURE — 99214 OFFICE O/P EST MOD 30 MIN: CPT
